# Patient Record
Sex: MALE | Race: WHITE | NOT HISPANIC OR LATINO
[De-identification: names, ages, dates, MRNs, and addresses within clinical notes are randomized per-mention and may not be internally consistent; named-entity substitution may affect disease eponyms.]

---

## 2017-10-29 ENCOUNTER — TRANSCRIPTION ENCOUNTER (OUTPATIENT)
Age: 67
End: 2017-10-29

## 2017-10-29 ENCOUNTER — INPATIENT (INPATIENT)
Facility: HOSPITAL | Age: 67
LOS: 1 days | Discharge: HOME | End: 2017-10-31
Attending: INTERNAL MEDICINE | Admitting: INTERNAL MEDICINE
Payer: MEDICARE

## 2017-10-29 VITALS
RESPIRATION RATE: 16 BRPM | HEART RATE: 72 BPM | DIASTOLIC BLOOD PRESSURE: 92 MMHG | TEMPERATURE: 98 F | SYSTOLIC BLOOD PRESSURE: 152 MMHG | OXYGEN SATURATION: 99 %

## 2017-10-29 DIAGNOSIS — I24.9 ACUTE ISCHEMIC HEART DISEASE, UNSPECIFIED: ICD-10-CM

## 2017-10-29 DIAGNOSIS — I25.10 ATHEROSCLEROTIC HEART DISEASE OF NATIVE CORONARY ARTERY WITHOUT ANGINA PECTORIS: ICD-10-CM

## 2017-10-29 DIAGNOSIS — I21.11 ST ELEVATION (STEMI) MYOCARDIAL INFARCTION INVOLVING RIGHT CORONARY ARTERY: ICD-10-CM

## 2017-10-29 DIAGNOSIS — Z90.49 ACQUIRED ABSENCE OF OTHER SPECIFIED PARTS OF DIGESTIVE TRACT: Chronic | ICD-10-CM

## 2017-10-29 LAB
ALBUMIN SERPL ELPH-MCNC: 4.4 G/DL — SIGNIFICANT CHANGE UP (ref 3.3–5)
ALP SERPL-CCNC: 100 U/L — SIGNIFICANT CHANGE UP (ref 40–120)
ALT FLD-CCNC: 19 U/L — SIGNIFICANT CHANGE UP (ref 4–41)
APTT BLD: 30.2 SEC — SIGNIFICANT CHANGE UP (ref 27.5–37.4)
AST SERPL-CCNC: 30 U/L — SIGNIFICANT CHANGE UP (ref 4–40)
BASOPHILS # BLD AUTO: 0.02 K/UL — SIGNIFICANT CHANGE UP (ref 0–0.2)
BASOPHILS NFR BLD AUTO: 0.2 % — SIGNIFICANT CHANGE UP (ref 0–2)
BILIRUB SERPL-MCNC: 0.5 MG/DL — SIGNIFICANT CHANGE UP (ref 0.2–1.2)
BLD GP AB SCN SERPL QL: NEGATIVE — SIGNIFICANT CHANGE UP
BUN SERPL-MCNC: 16 MG/DL — SIGNIFICANT CHANGE UP (ref 7–23)
CALCIUM SERPL-MCNC: 9.1 MG/DL — SIGNIFICANT CHANGE UP (ref 8.4–10.5)
CHLORIDE SERPL-SCNC: 105 MMOL/L — SIGNIFICANT CHANGE UP (ref 98–107)
CHOLEST SERPL-MCNC: 217 MG/DL — HIGH (ref 120–199)
CK MB BLD-MCNC: 10.1 NG/ML — HIGH (ref 1–6.6)
CK MB BLD-MCNC: 12.3 — HIGH (ref 0–2.5)
CK MB BLD-MCNC: 19.5 NG/ML — HIGH (ref 1–6.6)
CK MB BLD-MCNC: 30 NG/ML — HIGH (ref 1–6.6)
CK MB BLD-MCNC: SIGNIFICANT CHANGE UP (ref 0–2.5)
CK SERPL-CCNC: 107 U/L — SIGNIFICANT CHANGE UP (ref 30–200)
CK SERPL-CCNC: 223 U/L — HIGH (ref 30–200)
CK SERPL-CCNC: 243 U/L — HIGH (ref 30–200)
CO2 SERPL-SCNC: 29 MMOL/L — SIGNIFICANT CHANGE UP (ref 22–31)
CREAT SERPL-MCNC: 1.08 MG/DL — SIGNIFICANT CHANGE UP (ref 0.5–1.3)
EOSINOPHIL # BLD AUTO: 0.04 K/UL — SIGNIFICANT CHANGE UP (ref 0–0.5)
EOSINOPHIL NFR BLD AUTO: 0.4 % — SIGNIFICANT CHANGE UP (ref 0–6)
GLUCOSE SERPL-MCNC: 111 MG/DL — HIGH (ref 70–99)
HBA1C BLD-MCNC: 5.2 % — SIGNIFICANT CHANGE UP (ref 4–5.6)
HBA1C BLD-MCNC: 5.3 % — SIGNIFICANT CHANGE UP (ref 4–5.6)
HCT VFR BLD CALC: 40.7 % — SIGNIFICANT CHANGE UP (ref 39–50)
HCT VFR BLD CALC: 46.1 % — SIGNIFICANT CHANGE UP (ref 39–50)
HDLC SERPL-MCNC: 43 MG/DL — SIGNIFICANT CHANGE UP (ref 35–55)
HGB BLD-MCNC: 14.4 G/DL — SIGNIFICANT CHANGE UP (ref 13–17)
HGB BLD-MCNC: 15.6 G/DL — SIGNIFICANT CHANGE UP (ref 13–17)
IMM GRANULOCYTES # BLD AUTO: 0.03 # — SIGNIFICANT CHANGE UP
IMM GRANULOCYTES NFR BLD AUTO: 0.3 % — SIGNIFICANT CHANGE UP (ref 0–1.5)
INR BLD: 0.99 — SIGNIFICANT CHANGE UP (ref 0.88–1.17)
LIPID PNL WITH DIRECT LDL SERPL: 160 MG/DL — SIGNIFICANT CHANGE UP
LYMPHOCYTES # BLD AUTO: 3.04 K/UL — SIGNIFICANT CHANGE UP (ref 1–3.3)
LYMPHOCYTES # BLD AUTO: 32.3 % — SIGNIFICANT CHANGE UP (ref 13–44)
MAGNESIUM SERPL-MCNC: 2.2 MG/DL — SIGNIFICANT CHANGE UP (ref 1.6–2.6)
MCHC RBC-ENTMCNC: 32.5 PG — SIGNIFICANT CHANGE UP (ref 27–34)
MCHC RBC-ENTMCNC: 32.6 PG — SIGNIFICANT CHANGE UP (ref 27–34)
MCHC RBC-ENTMCNC: 33.8 % — SIGNIFICANT CHANGE UP (ref 32–36)
MCHC RBC-ENTMCNC: 35.4 % — SIGNIFICANT CHANGE UP (ref 32–36)
MCV RBC AUTO: 92.1 FL — SIGNIFICANT CHANGE UP (ref 80–100)
MCV RBC AUTO: 96 FL — SIGNIFICANT CHANGE UP (ref 80–100)
MONOCYTES # BLD AUTO: 0.78 K/UL — SIGNIFICANT CHANGE UP (ref 0–0.9)
MONOCYTES NFR BLD AUTO: 8.3 % — SIGNIFICANT CHANGE UP (ref 2–14)
NEUTROPHILS # BLD AUTO: 5.5 K/UL — SIGNIFICANT CHANGE UP (ref 1.8–7.4)
NEUTROPHILS NFR BLD AUTO: 58.5 % — SIGNIFICANT CHANGE UP (ref 43–77)
NRBC # FLD: 0 — SIGNIFICANT CHANGE UP
NRBC # FLD: 0 — SIGNIFICANT CHANGE UP
NT-PROBNP SERPL-SCNC: 1458 PG/ML — SIGNIFICANT CHANGE UP
PHOSPHATE SERPL-MCNC: 3.5 MG/DL — SIGNIFICANT CHANGE UP (ref 2.5–4.5)
PLATELET # BLD AUTO: 149 K/UL — LOW (ref 150–400)
PLATELET # BLD AUTO: 173 K/UL — SIGNIFICANT CHANGE UP (ref 150–400)
PMV BLD: 10.8 FL — SIGNIFICANT CHANGE UP (ref 7–13)
PMV BLD: 10.8 FL — SIGNIFICANT CHANGE UP (ref 7–13)
POTASSIUM SERPL-MCNC: 4 MMOL/L — SIGNIFICANT CHANGE UP (ref 3.5–5.3)
POTASSIUM SERPL-SCNC: 4 MMOL/L — SIGNIFICANT CHANGE UP (ref 3.5–5.3)
PROT SERPL-MCNC: 7.2 G/DL — SIGNIFICANT CHANGE UP (ref 6–8.3)
PROTHROM AB SERPL-ACNC: 11.1 SEC — SIGNIFICANT CHANGE UP (ref 9.8–13.1)
RBC # BLD: 4.42 M/UL — SIGNIFICANT CHANGE UP (ref 4.2–5.8)
RBC # BLD: 4.8 M/UL — SIGNIFICANT CHANGE UP (ref 4.2–5.8)
RBC # FLD: 12.1 % — SIGNIFICANT CHANGE UP (ref 10.3–14.5)
RBC # FLD: 12.1 % — SIGNIFICANT CHANGE UP (ref 10.3–14.5)
RH IG SCN BLD-IMP: POSITIVE — SIGNIFICANT CHANGE UP
SODIUM SERPL-SCNC: 145 MMOL/L — SIGNIFICANT CHANGE UP (ref 135–145)
TRIGL SERPL-MCNC: 175 MG/DL — HIGH (ref 10–149)
TROPONIN T SERPL-MCNC: 0.25 NG/ML — HIGH (ref 0–0.06)
TROPONIN T SERPL-MCNC: 0.62 NG/ML — HIGH (ref 0–0.06)
TROPONIN T SERPL-MCNC: 0.67 NG/ML — HIGH (ref 0–0.06)
TSH SERPL-MCNC: 3.11 UIU/ML — SIGNIFICANT CHANGE UP (ref 0.27–4.2)
TSH SERPL-MCNC: 5.82 UIU/ML — HIGH (ref 0.27–4.2)
WBC # BLD: 10.8 K/UL — HIGH (ref 3.8–10.5)
WBC # BLD: 9.41 K/UL — SIGNIFICANT CHANGE UP (ref 3.8–10.5)
WBC # FLD AUTO: 10.8 K/UL — HIGH (ref 3.8–10.5)
WBC # FLD AUTO: 9.41 K/UL — SIGNIFICANT CHANGE UP (ref 3.8–10.5)

## 2017-10-29 PROCEDURE — 71010: CPT | Mod: 26

## 2017-10-29 PROCEDURE — 99223 1ST HOSP IP/OBS HIGH 75: CPT

## 2017-10-29 PROCEDURE — 92941 PRQ TRLML REVSC TOT OCCL AMI: CPT | Mod: RC,GC

## 2017-10-29 PROCEDURE — 93458 L HRT ARTERY/VENTRICLE ANGIO: CPT | Mod: 26,59,GC

## 2017-10-29 PROCEDURE — 93010 ELECTROCARDIOGRAM REPORT: CPT | Mod: 76

## 2017-10-29 PROCEDURE — 93306 TTE W/DOPPLER COMPLETE: CPT | Mod: 26

## 2017-10-29 RX ORDER — ATORVASTATIN CALCIUM 80 MG/1
80 TABLET, FILM COATED ORAL AT BEDTIME
Qty: 0 | Refills: 0 | Status: DISCONTINUED | OUTPATIENT
Start: 2017-10-29 | End: 2017-10-31

## 2017-10-29 RX ORDER — ASPIRIN/CALCIUM CARB/MAGNESIUM 324 MG
325 TABLET ORAL DAILY
Qty: 0 | Refills: 0 | Status: DISCONTINUED | OUTPATIENT
Start: 2017-10-29 | End: 2017-10-29

## 2017-10-29 RX ORDER — HEPARIN SODIUM 5000 [USP'U]/ML
5000 INJECTION INTRAVENOUS; SUBCUTANEOUS ONCE
Qty: 0 | Refills: 0 | Status: COMPLETED | OUTPATIENT
Start: 2017-10-29 | End: 2017-10-29

## 2017-10-29 RX ORDER — HEPARIN SODIUM 5000 [USP'U]/ML
5000 INJECTION INTRAVENOUS; SUBCUTANEOUS EVERY 12 HOURS
Qty: 0 | Refills: 0 | Status: DISCONTINUED | OUTPATIENT
Start: 2017-10-29 | End: 2017-10-31

## 2017-10-29 RX ORDER — CLOPIDOGREL BISULFATE 75 MG/1
600 TABLET, FILM COATED ORAL ONCE
Qty: 0 | Refills: 0 | Status: COMPLETED | OUTPATIENT
Start: 2017-10-29 | End: 2017-10-29

## 2017-10-29 RX ORDER — METOPROLOL TARTRATE 50 MG
12.5 TABLET ORAL
Qty: 0 | Refills: 0 | Status: DISCONTINUED | OUTPATIENT
Start: 2017-10-29 | End: 2017-10-31

## 2017-10-29 RX ORDER — ASPIRIN/CALCIUM CARB/MAGNESIUM 324 MG
81 TABLET ORAL DAILY
Qty: 0 | Refills: 0 | Status: DISCONTINUED | OUTPATIENT
Start: 2017-10-29 | End: 2017-10-31

## 2017-10-29 RX ORDER — CLOPIDOGREL BISULFATE 75 MG/1
75 TABLET, FILM COATED ORAL DAILY
Qty: 0 | Refills: 0 | Status: DISCONTINUED | OUTPATIENT
Start: 2017-10-29 | End: 2017-10-31

## 2017-10-29 RX ADMIN — Medication 325 MILLIGRAM(S): at 01:02

## 2017-10-29 RX ADMIN — ATORVASTATIN CALCIUM 80 MILLIGRAM(S): 80 TABLET, FILM COATED ORAL at 21:35

## 2017-10-29 RX ADMIN — HEPARIN SODIUM 5000 UNIT(S): 5000 INJECTION INTRAVENOUS; SUBCUTANEOUS at 01:02

## 2017-10-29 RX ADMIN — CLOPIDOGREL BISULFATE 75 MILLIGRAM(S): 75 TABLET, FILM COATED ORAL at 12:19

## 2017-10-29 RX ADMIN — CLOPIDOGREL BISULFATE 600 MILLIGRAM(S): 75 TABLET, FILM COATED ORAL at 01:02

## 2017-10-29 RX ADMIN — Medication 12.5 MILLIGRAM(S): at 18:00

## 2017-10-29 RX ADMIN — HEPARIN SODIUM 5000 UNIT(S): 5000 INJECTION INTRAVENOUS; SUBCUTANEOUS at 18:00

## 2017-10-29 RX ADMIN — Medication 81 MILLIGRAM(S): at 12:18

## 2017-10-29 NOTE — H&P ADULT - PROBLEM SELECTOR PLAN 1
S/p 1 JOSE MIGUEL to dRCA found 99% occlusion   - Admit to CCU  - TTE in AM  -start ASA 81mg PO daily, plavix 75mg PO daily S/p 1 JOSE MIGUEL to dRCA found 99% occlusion   - Admit to CCU  - TTE in AM  - Trend cardiac enzymes  - Daily weights  - Strict I'sO's  -start ASA 81mg PO daily, plavix 75mg PO daily

## 2017-10-29 NOTE — H&P ADULT - ASSESSMENT
67yoM w/ no PMHx p/w recurrent left sided chest pressure with radiation to left side that started last night a/w SOB and diaphoresis relieved with belching    In ED, found to have IWSTEMI.  Cath team activated, dRCA 99% occluded s/p 1 JOSE MIGUEL.

## 2017-10-29 NOTE — PROGRESS NOTE ADULT - PROBLEM SELECTOR PLAN 1
-Continue DAPT with aspirin and plavix, high intensity statin  -Consider BB today  - TTE in AM  - Trend cardiac enzymes, monitor risk factor profile, lipid panel, TSH, HGBA1c

## 2017-10-29 NOTE — DISCHARGE NOTE ADULT - ADDITIONAL INSTRUCTIONS
follow up with you PCP upon discharge Follow up with the cardiologist (Dr. Delfino Marlow) within 1-2 weeks of discharge.   Follow up with your primary medical provider within 1-2 weeks of discharge. Follow up with the cardiologist (Dr. Delfino Marlow); your appointment is on November 9th, at 9:00 am. Address and phone number are below. Please bring your insurance card and photo ID.   Follow up with your primary medical provider within 1-2 weeks of discharge.

## 2017-10-29 NOTE — H&P ADULT - NSHPSOCIALHISTORY_GEN_ALL_CORE
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  Denies cigarette or illicit drug use.  Occasional beer, 1 per week

## 2017-10-29 NOTE — H&P ADULT - HISTORY OF PRESENT ILLNESS
This is a 67yoM w/ no PMHx p/w recurrent chest pain that started last night a/w GI discomfort. This is a 67yoM w/ no PMHx p/w recurrent left sided chest pressure with radiation to left side that started last night a/w SOB and diaphoresis relieved with belching.  States pain started last night after dinner when he was going for his regular walk and has been intermittent since arrival to hospital.  Patient denies history of chest pain or pressure while ambulating or climbing stairs.  Denies palpitations, lightheadedness/dizziness, LOC during event.  Last visit to a doctor was more than 10 years ago when he saw a urologist for a kidney stone that passed on its own.      In ED, found to have IWSTEMI.  Cath team activated, dRCA 99% occluded s/p 1 JOSE MIGUEL.

## 2017-10-29 NOTE — ED ADULT NURSE NOTE - OBJECTIVE STATEMENT
pt on b ed aox3,Dr. Escobar alerted Red side MD to r/o STEMI c.o  Left sided chest pain since yesterday on and off, radiates to left arm with SOB worse on exertion denies Palpitation Headache Dizziness Nausea vomiting Sweating on cm sinus rhythm MD at [facility] evaluate the pt. will monitor placed on Zoll monitor

## 2017-10-29 NOTE — DISCHARGE NOTE ADULT - HOSPITAL COURSE
Patient is a 67year old  male with PSH of appendicitix and kidney stones, no signigicant PMH, non-smoker, presented to ER with IWSTEMI. Patient went emergently to the cath lab and a catheterization via right radial artery was performed and he was found to have  a 99% dRCA. He is now s/p 1 JOSE MIGUEL to dRCA. Right wrist site is intact with no hematoma, +radial pulse. 67 year old  male with PSH of appendicitix and kidney stones, no significant PMH, non-smoker, presented to ER with IWSTEMI. Patient went emergently to the cath lab and a catheterization via right radial artery was performed and he was found to have a 99% dRCA. He is now s/p 1 JOSE MIGUEL to dRCA. Right wrist site is intact with no hematoma, +radial pulse. 67 year old  male with PSH of appendicitix and kidney stones, no significant PMH, non-smoker, presented to ER with IWSTEMI. Patient went emergently to the cath lab and a catheterization via right radial artery was performed and he was found to have a 99% dRCA. He is now s/p 1 JOSE MIGUEL to dRCA. Right wrist site is intact with no hematoma, +radial pulse. Pt started on DAPT, lipitor, low dose metoprolol. Pt was hypotensive down to 80s at times. ACEI was deferred to be started as outpatient when BP normalizes. TTE showed 45% EF, mild segmental LV dysfunction, hypokinetic inferior and inferolateral walls. Mild diastolic dysfunction (Stage 1). Normal RV. Pt did well after the cath and was ready for discharge.

## 2017-10-29 NOTE — CHART NOTE - NSCHARTNOTEFT_GEN_A_CORE
RADIAL BAND REMOVAL NOTE    Right radial band removed without and complications. No bleeding or hematoma. Positive peripheral pulses. Good capillary refill. RN to monitor site for bleeding and hematoma. nurses notes/vital signs

## 2017-10-29 NOTE — ED PROVIDER NOTE - ST/T WAVE
biphasic TW  inferiorly with mild   St elevation  (<1mm)   downsloping ST in aVL    right sided leads negative

## 2017-10-29 NOTE — ED PROVIDER NOTE - OBJECTIVE STATEMENT
pt presents with recurrent chest pain  Occurred for 1st time last night  lasted a couple of hours  had associated belching but pain was worse with exertion  Resolved until tonight around 5:30  again assoc with belching but continued to wax and wane  Pt denies ever having this pain before  he denies PMH and has not seen a physician in many years

## 2017-10-29 NOTE — ED ADULT TRIAGE NOTE - CHIEF COMPLAINT QUOTE
Pt arrives to ED reporting he does not "feel right" on the left side of his chest.  Pt reports discomfort began yesterday while walking after supper.  Pt reports pain was intermittent.  Pt denies prior cardiac history.  EKG performed in triage. Pt arrives to ED reporting he does not "feel right" on the left side of his chest.  Pt reports discomfort began yesterday while walking after supper.  Pt reports pain was intermittent.  Pt denies prior cardiac history.  EKG performed in triage.  Dr. Escobar reviewed ekg, Charge RN notified, pt brought directly to room 3.

## 2017-10-29 NOTE — ED PROVIDER NOTE - MEDICAL DECISION MAKING DETAILS
pt with new onset CP  worse with exertion  waxing and waning in intensity  EKG  suggestive of inf process  ST elevation mild but assoc biphasic TW  and downsloping ST in aVL    cath attending called   He notified team  given ASA  plavix  heparin bolus

## 2017-10-29 NOTE — DISCHARGE NOTE ADULT - MEDICATION SUMMARY - MEDICATIONS TO TAKE
I will START or STAY ON the medications listed below when I get home from the hospital:    aspirin 81 mg oral delayed release tablet  -- 1 tab(s) by mouth once a day  -- Indication: For CAD (coronary artery disease)    atorvastatin 80 mg oral tablet  -- 1 tab(s) by mouth once a day (at bedtime)  -- Indication: For CAD (coronary artery disease)    clopidogrel 75 mg oral tablet  -- 1 tab(s) by mouth once a day  -- Indication: For CAD (coronary artery disease)    metoprolol tartrate 25 mg oral tablet  -- 0.5 tab(s) by mouth 2 times a day   -- It is very important that you take or use this exactly as directed.  Do not skip doses or discontinue unless directed by your doctor.  May cause drowsiness.  Alcohol may intensify this effect.  Use care when operating dangerous machinery.  Some non-prescription drugs may aggravate your condition.  Read all labels carefully.  If a warning appears, check with your doctor before taking.  Take with food or milk.  This drug may impair the ability to drive or operate machinery.  Use care until you become familiar with its effects.    -- Indication: For CAD (coronary artery disease)

## 2017-10-29 NOTE — DISCHARGE NOTE ADULT - CARE PROVIDER_API CALL
Delfino Marlow (MD; PhD), Cardiology; Internal Medicine; Vascular Medicine  [address] [place] [address] [address] [place] [id number]  Phone: [phone]  Fax: [phone]

## 2017-10-29 NOTE — DISCHARGE NOTE ADULT - PLAN OF CARE
Patient will have no further chest pain Continue medications as prescribed. Follow up with cardiologist regularly. Continue low salt, low cholesterol, low fat diet. Follow a heart healthy diet. Exercise regularly. Patient will make lifestyle modifications to prevent worsening CAD Continue medications as prescribed and reduce stress. Continue medications as prescribed. Follow up with a cardiologist (Dr. Delfino Marlow) within 1-2 weeks of discharge. Follow up with cardiologist regularly. Continue low salt, low cholesterol, low fat diet. Follow a heart healthy diet. Exercise regularly. Continue medications as prescribed. Follow up with a cardiologist (Dr. Delfino Marlow) within 1-2 weeks of discharge. Appointment was made for you. Please see below. Follow up with cardiologist regularly. Continue low salt, low cholesterol, low fat diet. Follow a heart healthy diet. Exercise regularly.

## 2017-10-29 NOTE — ED PROVIDER NOTE - ATTENDING CONTRIBUTION TO CARE
[place]  pt with waxing and waning new onset SSCP with EKG suggestive of inferior process   cath attending called      cath team notifies   given ASA  pl;avix  heparin

## 2017-10-29 NOTE — PROGRESS NOTE ADULT - SUBJECTIVE AND OBJECTIVE BOX
Date of Admission:  10/29/17  24H hour events:   s/p Cath lab overnight 1 stent to distal RCA overnight. Rt radial band removed and site looks good with a positive pulse  Vital Signs Last 24 Hrs  T(C): 36.8 (29 Oct 2017 04:00), Max: 36.8 (29 Oct 2017 02:43)  T(F): 98.2 (29 Oct 2017 04:00), Max: 98.2 (29 Oct 2017 02:43)  HR: 71 (29 Oct 2017 07:30) (66 - 80)  BP: 117/69 (29 Oct 2017 07:30) (116/67 - 163/85)  BP(mean): 80 (29 Oct 2017 07:30) (79 - 92)  RR: 15 (29 Oct 2017 07:30) (14 - 19)  SpO2: 99% (29 Oct 2017 07:30) (99% - 100%)  I&O's Summary    28 Oct 2017 07:01  -  29 Oct 2017 07:00  --------------------------------------------------------  IN: 0 mL / OUT: 1250 mL / NET: -1250 mL        MEDICATIONS:  aspirin enteric coated 81 milliGRAM(s) Oral daily  clopidogrel Tablet 75 milliGRAM(s) Oral daily  heparin  Injectable 5000 Unit(s) SubCutaneous every 12 hours  atorvastatin 80 milliGRAM(s) Oral at bedtime    REVIEW OF SYSTEMS:  Complete 10point ROS negative.    PHYSICAL EXAM:  General: NAD  Cardiac: +S1+S2 RRR No MRG  Resp: CTA bilateral  GI: soft, nontender, nondistended  Ext: no CCE  tele:SR      LABS:	 	    CBC Full  -  ( 29 Oct 2017 00:45 )  WBC Count : 9.41 K/uL  Hemoglobin : 15.6 g/dL  Hematocrit : 46.1 %  Platelet Count - Automated : 173 K/uL  Mean Cell Volume : 96.0 fL  Mean Cell Hemoglobin : 32.5 pg  Mean Cell Hemoglobin Concentration : 33.8 %  Auto Neutrophil # : 5.50 K/uL  Auto Lymphocyte # : 3.04 K/uL  Auto Monocyte # : 0.78 K/uL  Auto Eosinophil # : 0.04 K/uL  Auto Basophil # : 0.02 K/uL  Auto Neutrophil % : 58.5 %  Auto Lymphocyte % : 32.3 %  Auto Monocyte % : 8.3 %  Auto Eosinophil % : 0.4 %  Auto Basophil % : 0.2 %    10-29    145  |  105  |  16  ----------------------------<  111<H>  4.0   |  29  |  1.08    Ca    9.1      29 Oct 2017 00:45  Phos  3.5     10-29  Mg     2.2     10-29    TPro  7.2  /  Alb  4.4  /  TBili  0.5  /  DBili  x   /  AST  30  /  ALT  19  /  AlkPhos  100  10-29      proBNP: Serum Pro-Brain Natriuretic Peptide: 1458 pg/mL (10-29 @ 00:45)

## 2017-10-29 NOTE — H&P ADULT - FAMILY HISTORY
Grandparent  Still living? Unknown  Family history of coronary artery disease in grandmother, Age at diagnosis: Age Unknown  Family history of coronary artery disease in grandfather, Age at diagnosis: Age Unknown

## 2017-10-29 NOTE — DISCHARGE NOTE ADULT - CARE PLAN
Principal Discharge DX:	STEMI involving right coronary artery  Goal:	Patient will have no further chest pain  Instructions for follow-up, activity and diet:	Continue medications as prescribed. Follow up with cardiologist regularly. Continue low salt, low cholesterol, low fat diet. Follow a heart healthy diet. Exercise regularly.  Secondary Diagnosis:	CAD (coronary artery disease)  Goal:	Patient will make lifestyle modifications to prevent worsening CAD  Instructions for follow-up, activity and diet:	Continue medications as prescribed and reduce stress. Principal Discharge DX:	STEMI involving right coronary artery  Goal:	Patient will have no further chest pain  Instructions for follow-up, activity and diet:	Continue medications as prescribed. Follow up with a cardiologist (Dr. Delfino Marlow) within 1-2 weeks of discharge. Follow up with cardiologist regularly. Continue low salt, low cholesterol, low fat diet. Follow a heart healthy diet. Exercise regularly.  Secondary Diagnosis:	CAD (coronary artery disease)  Goal:	Patient will make lifestyle modifications to prevent worsening CAD  Instructions for follow-up, activity and diet:	Continue medications as prescribed and reduce stress. Principal Discharge DX:	STEMI involving right coronary artery  Goal:	Patient will have no further chest pain  Instructions for follow-up, activity and diet:	Continue medications as prescribed. Follow up with a cardiologist (Dr. Delfino Marlow) within 1-2 weeks of discharge. Appointment was made for you. Please see below. Follow up with cardiologist regularly. Continue low salt, low cholesterol, low fat diet. Follow a heart healthy diet. Exercise regularly.  Secondary Diagnosis:	CAD (coronary artery disease)  Goal:	Patient will make lifestyle modifications to prevent worsening CAD  Instructions for follow-up, activity and diet:	Continue medications as prescribed and reduce stress.

## 2017-10-29 NOTE — ED PROVIDER NOTE - MUSCULOSKELETAL, MLM
Spine appears normal, range of motion is not limited, no muscle or joint tenderness  pt with nl pulses all H extremities

## 2017-10-29 NOTE — ED ADULT NURSE NOTE - CHIEF COMPLAINT QUOTE
Pt arrives to ED reporting he does not "feel right" on the left side of his chest.  Pt reports discomfort began yesterday while walking after supper.  Pt reports pain was intermittent.  Pt denies prior cardiac history.  EKG performed in triage.  Dr. Escobar reviewed ekg, Charge RN notified, pt brought directly to room 3.

## 2017-10-30 LAB
BUN SERPL-MCNC: 17 MG/DL — SIGNIFICANT CHANGE UP (ref 7–23)
CALCIUM SERPL-MCNC: 8.7 MG/DL — SIGNIFICANT CHANGE UP (ref 8.4–10.5)
CHLORIDE SERPL-SCNC: 103 MMOL/L — SIGNIFICANT CHANGE UP (ref 98–107)
CK MB BLD-MCNC: 12.71 NG/ML — HIGH (ref 1–6.6)
CK SERPL-CCNC: 170 U/L — SIGNIFICANT CHANGE UP (ref 30–200)
CO2 SERPL-SCNC: 24 MMOL/L — SIGNIFICANT CHANGE UP (ref 22–31)
CREAT SERPL-MCNC: 0.92 MG/DL — SIGNIFICANT CHANGE UP (ref 0.5–1.3)
GLUCOSE SERPL-MCNC: 96 MG/DL — SIGNIFICANT CHANGE UP (ref 70–99)
HCT VFR BLD CALC: 40.9 % — SIGNIFICANT CHANGE UP (ref 39–50)
HGB BLD-MCNC: 14 G/DL — SIGNIFICANT CHANGE UP (ref 13–17)
MAGNESIUM SERPL-MCNC: 2.1 MG/DL — SIGNIFICANT CHANGE UP (ref 1.6–2.6)
MCHC RBC-ENTMCNC: 31.7 PG — SIGNIFICANT CHANGE UP (ref 27–34)
MCHC RBC-ENTMCNC: 34.2 % — SIGNIFICANT CHANGE UP (ref 32–36)
MCV RBC AUTO: 92.7 FL — SIGNIFICANT CHANGE UP (ref 80–100)
NRBC # FLD: 0 — SIGNIFICANT CHANGE UP
PHOSPHATE SERPL-MCNC: 3.4 MG/DL — SIGNIFICANT CHANGE UP (ref 2.5–4.5)
PLATELET # BLD AUTO: 146 K/UL — LOW (ref 150–400)
PMV BLD: 11 FL — SIGNIFICANT CHANGE UP (ref 7–13)
POTASSIUM SERPL-MCNC: 4.2 MMOL/L — SIGNIFICANT CHANGE UP (ref 3.5–5.3)
POTASSIUM SERPL-SCNC: 4.2 MMOL/L — SIGNIFICANT CHANGE UP (ref 3.5–5.3)
RBC # BLD: 4.41 M/UL — SIGNIFICANT CHANGE UP (ref 4.2–5.8)
RBC # FLD: 12.3 % — SIGNIFICANT CHANGE UP (ref 10.3–14.5)
SODIUM SERPL-SCNC: 141 MMOL/L — SIGNIFICANT CHANGE UP (ref 135–145)
TROPONIN T SERPL-MCNC: 0.67 NG/ML — HIGH (ref 0–0.06)
WBC # BLD: 8.1 K/UL — SIGNIFICANT CHANGE UP (ref 3.8–10.5)
WBC # FLD AUTO: 8.1 K/UL — SIGNIFICANT CHANGE UP (ref 3.8–10.5)

## 2017-10-30 PROCEDURE — 99233 SBSQ HOSP IP/OBS HIGH 50: CPT

## 2017-10-30 PROCEDURE — 93010 ELECTROCARDIOGRAM REPORT: CPT

## 2017-10-30 RX ORDER — SODIUM CHLORIDE 9 MG/ML
250 INJECTION INTRAMUSCULAR; INTRAVENOUS; SUBCUTANEOUS ONCE
Qty: 0 | Refills: 0 | Status: COMPLETED | OUTPATIENT
Start: 2017-10-30 | End: 2017-10-30

## 2017-10-30 RX ADMIN — Medication 81 MILLIGRAM(S): at 11:13

## 2017-10-30 RX ADMIN — HEPARIN SODIUM 5000 UNIT(S): 5000 INJECTION INTRAVENOUS; SUBCUTANEOUS at 18:06

## 2017-10-30 RX ADMIN — Medication 12.5 MILLIGRAM(S): at 18:06

## 2017-10-30 RX ADMIN — CLOPIDOGREL BISULFATE 75 MILLIGRAM(S): 75 TABLET, FILM COATED ORAL at 11:13

## 2017-10-30 RX ADMIN — SODIUM CHLORIDE 1000 MILLILITER(S): 9 INJECTION INTRAMUSCULAR; INTRAVENOUS; SUBCUTANEOUS at 11:05

## 2017-10-30 RX ADMIN — HEPARIN SODIUM 5000 UNIT(S): 5000 INJECTION INTRAVENOUS; SUBCUTANEOUS at 05:52

## 2017-10-30 RX ADMIN — ATORVASTATIN CALCIUM 80 MILLIGRAM(S): 80 TABLET, FILM COATED ORAL at 23:09

## 2017-10-30 NOTE — PROGRESS NOTE ADULT - SUBJECTIVE AND OBJECTIVE BOX
Patient is a 67y old  Male who presents with a chief complaint of IWSTEMI (29 Oct 2017 11:26)      Overnight Events: Hypotensive overnight down to 88/49, 95/49. Tele: PVCs, rate in 60s. Today, pt feels fine. Denies CP, SOB, fevers, chills, n/v, diaphoresis, abd pain, diarrhea.     Vital Signs Last 24 Hrs  T(C): 36.7 (30 Oct 2017 08:00), Max: 37.5 (29 Oct 2017 19:58)  T(F): 98.1 (30 Oct 2017 08:00), Max: 99.5 (29 Oct 2017 19:58)  HR: 67 (30 Oct 2017 08:00) (1 - 96)  BP: 109/63 (30 Oct 2017 08:00) (94/49 - 136/73)  BP(mean): 75 (30 Oct 2017 08:00) (59 - 90)  RR: 14 (30 Oct 2017 08:00) (14 - 20)  SpO2: 99% (30 Oct 2017 08:00) (95% - 99%)    CAPILLARY BLOOD GLUCOSE          I&O's Summary    29 Oct 2017 07:01  -  30 Oct 2017 07:00  --------------------------------------------------------  IN: 800 mL / OUT: 400 mL / NET: 400 mL        Gen: NAD, lying comfortably in bed.   HEENT: MMM  Cardio: S1 S2 WNL, No M/R/G, RRR  Resp: CTA B/L  Abd: soft, NT/ND BS+  Skin: no rashes, moist, no LE edema  Msk: normal ROM  Neuro: no focal deficits, CN grossly intact  Psych: AAO X3    MEDICATIONS  (STANDING):  aspirin enteric coated 81 milliGRAM(s) Oral daily  atorvastatin 80 milliGRAM(s) Oral at bedtime  clopidogrel Tablet 75 milliGRAM(s) Oral daily  heparin  Injectable 5000 Unit(s) SubCutaneous every 12 hours  metoprolol     tartrate 12.5 milliGRAM(s) Oral two times a day    MEDICATIONS  (PRN):      CBC Full  -  ( 30 Oct 2017 03:50 )  WBC Count : 8.10 K/uL  Hemoglobin : 14.0 g/dL  Hematocrit : 40.9 %  Platelet Count - Automated : 146 K/uL  Mean Cell Volume : 92.7 fL  Mean Cell Hemoglobin : 31.7 pg  Mean Cell Hemoglobin Concentration : 34.2 %  Auto Neutrophil # : x  Auto Lymphocyte # : x  Auto Monocyte # : x  Auto Eosinophil # : x  Auto Basophil # : x  Auto Neutrophil % : x  Auto Lymphocyte % : x  Auto Monocyte % : x  Auto Eosinophil % : x  Auto Basophil % : x                            14.0   8.10  )-----------( 146      ( 30 Oct 2017 03:50 )             40.9       LIVER FUNCTIONS - ( 29 Oct 2017 00:45 )  Alb: 4.4 g/dL / Pro: 7.2 g/dL / ALK PHOS: 100 u/L / ALT: 19 u/L / AST: 30 u/L / GGT: x             PT/INR - ( 29 Oct 2017 00:45 )   PT: 11.1 SEC;   INR: 0.99          PTT - ( 29 Oct 2017 00:45 )  PTT:30.2 SEC    CARDIAC MARKERS ( 30 Oct 2017 03:50 )  x     / 0.67 ng/mL / 170 u/L / 12.71 ng/mL / x      CARDIAC MARKERS ( 29 Oct 2017 19:40 )  x     / 0.62 ng/mL / 223 u/L / 19.50 ng/mL / x      CARDIAC MARKERS ( 29 Oct 2017 09:00 )  x     / 0.67 ng/mL / 243 u/L / 30.00 ng/mL / x      CARDIAC MARKERS ( 29 Oct 2017 00:45 )  x     / 0.25 ng/mL / 107 u/L / 10.10 ng/mL / x                    Imaging:    Assessment:    Plan:

## 2017-10-30 NOTE — PROGRESS NOTE ADULT - ATTENDING COMMENTS
Patient seen and examined. Agree with above assessment and plan. IWMI with PCI to RCA. Had episodes of hypotension overnight and given IVF bolus.   Given beta blocker at 6am and now on hold.   Will check TTE

## 2017-10-30 NOTE — PROGRESS NOTE ADULT - ASSESSMENT
67yoM w/ no PMHx p/w recurrent left sided chest pressure with radiation to left side that started Saturday night a/w SOB and diaphoresis relieved with belching, found to have IWSTEMI s/p Cath dRCA 99% occluded s/p 1 JOSE MIGUEL.

## 2017-10-30 NOTE — PROGRESS NOTE ADULT - PROBLEM SELECTOR PLAN 1
S/p 1 JOSE MIGUEL to dRCA found 99% occlusion   - TTE in today  - Trend cardiac enzymes  - Daily weights  - Strict I/O  - c/w ASA 81mg PO daily, plavix 75mg PO daily

## 2017-10-31 VITALS — DIASTOLIC BLOOD PRESSURE: 64 MMHG | HEART RATE: 70 BPM | SYSTOLIC BLOOD PRESSURE: 112 MMHG

## 2017-10-31 LAB
BUN SERPL-MCNC: 19 MG/DL — SIGNIFICANT CHANGE UP (ref 7–23)
CALCIUM SERPL-MCNC: 8.4 MG/DL — SIGNIFICANT CHANGE UP (ref 8.4–10.5)
CHLORIDE SERPL-SCNC: 104 MMOL/L — SIGNIFICANT CHANGE UP (ref 98–107)
CO2 SERPL-SCNC: 22 MMOL/L — SIGNIFICANT CHANGE UP (ref 22–31)
CREAT SERPL-MCNC: 1.08 MG/DL — SIGNIFICANT CHANGE UP (ref 0.5–1.3)
GLUCOSE SERPL-MCNC: 95 MG/DL — SIGNIFICANT CHANGE UP (ref 70–99)
HCT VFR BLD CALC: 40 % — SIGNIFICANT CHANGE UP (ref 39–50)
HGB BLD-MCNC: 13.9 G/DL — SIGNIFICANT CHANGE UP (ref 13–17)
MAGNESIUM SERPL-MCNC: 1.9 MG/DL — SIGNIFICANT CHANGE UP (ref 1.6–2.6)
MCHC RBC-ENTMCNC: 32.2 PG — SIGNIFICANT CHANGE UP (ref 27–34)
MCHC RBC-ENTMCNC: 34.8 % — SIGNIFICANT CHANGE UP (ref 32–36)
MCV RBC AUTO: 92.6 FL — SIGNIFICANT CHANGE UP (ref 80–100)
NRBC # FLD: 0 — SIGNIFICANT CHANGE UP
PHOSPHATE SERPL-MCNC: 3.4 MG/DL — SIGNIFICANT CHANGE UP (ref 2.5–4.5)
PLATELET # BLD AUTO: 146 K/UL — LOW (ref 150–400)
PMV BLD: 11.1 FL — SIGNIFICANT CHANGE UP (ref 7–13)
POTASSIUM SERPL-MCNC: 4.4 MMOL/L — SIGNIFICANT CHANGE UP (ref 3.5–5.3)
POTASSIUM SERPL-SCNC: 4.4 MMOL/L — SIGNIFICANT CHANGE UP (ref 3.5–5.3)
RBC # BLD: 4.32 M/UL — SIGNIFICANT CHANGE UP (ref 4.2–5.8)
RBC # FLD: 12.1 % — SIGNIFICANT CHANGE UP (ref 10.3–14.5)
SODIUM SERPL-SCNC: 141 MMOL/L — SIGNIFICANT CHANGE UP (ref 135–145)
WBC # BLD: 7.96 K/UL — SIGNIFICANT CHANGE UP (ref 3.8–10.5)
WBC # FLD AUTO: 7.96 K/UL — SIGNIFICANT CHANGE UP (ref 3.8–10.5)

## 2017-10-31 RX ORDER — METOPROLOL TARTRATE 50 MG
0.5 TABLET ORAL
Qty: 30 | Refills: 0
Start: 2017-10-31 | End: 2017-11-30

## 2017-10-31 RX ORDER — ASPIRIN/CALCIUM CARB/MAGNESIUM 324 MG
1 TABLET ORAL
Qty: 30 | Refills: 0 | OUTPATIENT
Start: 2017-10-31 | End: 2017-11-30

## 2017-10-31 RX ORDER — ATORVASTATIN CALCIUM 80 MG/1
1 TABLET, FILM COATED ORAL
Qty: 30 | Refills: 0 | OUTPATIENT
Start: 2017-10-31 | End: 2017-11-30

## 2017-10-31 RX ORDER — CLOPIDOGREL BISULFATE 75 MG/1
1 TABLET, FILM COATED ORAL
Qty: 30 | Refills: 0
Start: 2017-10-31 | End: 2017-11-30

## 2017-10-31 RX ORDER — ASPIRIN/CALCIUM CARB/MAGNESIUM 324 MG
1 TABLET ORAL
Qty: 30 | Refills: 0
Start: 2017-10-31 | End: 2017-11-30

## 2017-10-31 RX ORDER — ATORVASTATIN CALCIUM 80 MG/1
1 TABLET, FILM COATED ORAL
Qty: 30 | Refills: 0
Start: 2017-10-31 | End: 2017-11-30

## 2017-10-31 RX ORDER — CLOPIDOGREL BISULFATE 75 MG/1
1 TABLET, FILM COATED ORAL
Qty: 30 | Refills: 0 | OUTPATIENT
Start: 2017-10-31 | End: 2017-11-30

## 2017-10-31 RX ORDER — MAGNESIUM SULFATE 500 MG/ML
2 VIAL (ML) INJECTION ONCE
Qty: 0 | Refills: 0 | Status: COMPLETED | OUTPATIENT
Start: 2017-10-31 | End: 2017-10-31

## 2017-10-31 RX ORDER — METOPROLOL TARTRATE 50 MG
0.5 TABLET ORAL
Qty: 30 | Refills: 0 | OUTPATIENT
Start: 2017-10-31 | End: 2017-11-30

## 2017-10-31 RX ADMIN — Medication 50 GRAM(S): at 08:34

## 2017-10-31 RX ADMIN — CLOPIDOGREL BISULFATE 75 MILLIGRAM(S): 75 TABLET, FILM COATED ORAL at 12:00

## 2017-10-31 RX ADMIN — Medication 81 MILLIGRAM(S): at 12:00

## 2017-10-31 RX ADMIN — HEPARIN SODIUM 5000 UNIT(S): 5000 INJECTION INTRAVENOUS; SUBCUTANEOUS at 06:23

## 2017-10-31 RX ADMIN — Medication 12.5 MILLIGRAM(S): at 09:29

## 2017-10-31 NOTE — PROGRESS NOTE ADULT - ATTENDING COMMENTS
Agree with above assessment and plan. Patients EF~45%. IWMI with PCI to RCA. BP on lower side with metoprolol 12.5 mg BID  Hold ACEi for now given hypotension; can start as outpatient  Continue high dose statin  Continue ASA and Plavix  DC home with outpatient followup

## 2017-10-31 NOTE — PROGRESS NOTE ADULT - SUBJECTIVE AND OBJECTIVE BOX
Patient is a 67y old  Male who presents with a chief complaint of IWSTEMI (29 Oct 2017 11:26)      Overnight Events: No acute overnight events Tele: PVCs, rate in 60s-90s. Today, pt feels fine. Denies CP, SOB, fevers, chills, n/v, diaphoresis, abd pain, diarrhea.       MEDICATIONS  (STANDING):  aspirin enteric coated 81 milliGRAM(s) Oral daily  atorvastatin 80 milliGRAM(s) Oral at bedtime  clopidogrel Tablet 75 milliGRAM(s) Oral daily  heparin  Injectable 5000 Unit(s) SubCutaneous every 12 hours  metoprolol     tartrate 12.5 milliGRAM(s) Oral two times a day    MEDICATIONS  (PRN):    Vital Signs - Last 24 Hrs    T(C): 37 (10-31-17 @ 07:30), Max: 37.3 (10-30-17 @ 20:00)  HR: 68 (10-31-17 @ 08:00) (64 - 106)  BP: 97/51 (10-31-17 @ 08:00) (85/69 - 131/76)  RR: 16 (10-31-17 @ 08:00) (14 - 18)  SpO2: 94% (10-31-17 @ 08:00) (94% - 100%)  Wt(kg): --  Daily     Daily     I&O's Summary    30 Oct 2017 07:01  -  31 Oct 2017 07:00  --------------------------------------------------------  IN: 790 mL / OUT: 0 mL / NET: 790 mL    31 Oct 2017 07:01  -  31 Oct 2017 09:05  --------------------------------------------------------  IN: 50 mL / OUT: 0 mL / NET: 50 mL        Gen: NAD, lying comfortably in bed.   HEENT: MMM  Cardio: S1 S2 WNL, No M/R/G, RRR  Resp: CTA B/L  Abd: soft, NT/ND BS+  Skin: no rashes, moist, no LE edema  Msk: normal ROM  Neuro: no focal deficits, CN grossly intact  Psych: AAO X3      LABS:   10-31    141  |  104  |  19  ----------------------------<  95  4.4   |  22  |  1.08  10-30    141  |  103  |  17  ----------------------------<  96  4.2   |  24  |  0.92  10-29    145  |  105  |  16  ----------------------------<  111<H>  4.0   |  29  |  1.08    Ca    8.4      31 Oct 2017 05:15  Ca    8.7      30 Oct 2017 03:50  Ca    9.1      29 Oct 2017 00:45  Phos  3.4     10-31  Mg     1.9     10-31    TPro  7.2  /  Alb  4.4  /  TBili  0.5  /  DBili  x   /  AST  30  /  ALT  19  /  AlkPhos  100  10-29                                              13.9   7.96  )-----------( 146      ( 31 Oct 2017 05:15 )             40.0                         14.0   8.10  )-----------( 146      ( 30 Oct 2017 03:50 )             40.9                         14.4   10.80 )-----------( 149      ( 29 Oct 2017 09:00 )             40.7     CAPILLARY BLOOD GLUCOSE          Imaging:    Assessment:    Plan:

## 2017-10-31 NOTE — PROGRESS NOTE ADULT - PROBLEM SELECTOR PLAN 1
S/p 1 JOSE MIGUEL to dRCA found 99% occlusion   - TTE showed 45% EF, mild segmental LV dysfunction  - c/w ASA 81mg PO daily, plavix 75mg PO daily S/p 1 JOSE MIGUEL to dRCA found 99% occlusion   - TTE showed 45% EF, mild segmental LV dysfunction  - c/w ASA 81mg PO daily, plavix 75mg PO daily  - defer starting ACEI for outpt  - f/u w/ cardiology Dr. Delfino Marlow as outpt

## 2017-11-08 ENCOUNTER — RECORD ABSTRACTING (OUTPATIENT)
Age: 67
End: 2017-11-08

## 2017-11-09 ENCOUNTER — APPOINTMENT (OUTPATIENT)
Dept: CARDIOLOGY | Facility: CLINIC | Age: 67
End: 2017-11-09
Payer: MEDICARE

## 2017-11-09 ENCOUNTER — NON-APPOINTMENT (OUTPATIENT)
Age: 67
End: 2017-11-09

## 2017-11-09 VITALS
WEIGHT: 166 LBS | RESPIRATION RATE: 16 BRPM | HEIGHT: 68 IN | BODY MASS INDEX: 25.16 KG/M2 | SYSTOLIC BLOOD PRESSURE: 123 MMHG | OXYGEN SATURATION: 98 % | HEART RATE: 57 BPM | DIASTOLIC BLOOD PRESSURE: 76 MMHG

## 2017-11-09 VITALS — HEIGHT: 68 IN | BODY MASS INDEX: 25.24 KG/M2

## 2017-11-09 DIAGNOSIS — I21.11 ST ELEVATION (STEMI) MYOCARDIAL INFARCTION INVOLVING RIGHT CORONARY ARTERY: ICD-10-CM

## 2017-11-09 PROCEDURE — 99204 OFFICE O/P NEW MOD 45 MIN: CPT

## 2017-11-09 PROCEDURE — 93000 ELECTROCARDIOGRAM COMPLETE: CPT

## 2017-11-27 ENCOUNTER — MEDICATION RENEWAL (OUTPATIENT)
Age: 67
End: 2017-11-27

## 2017-12-21 ENCOUNTER — NON-APPOINTMENT (OUTPATIENT)
Age: 67
End: 2017-12-21

## 2017-12-21 ENCOUNTER — APPOINTMENT (OUTPATIENT)
Dept: CARDIOLOGY | Facility: CLINIC | Age: 67
End: 2017-12-21
Payer: MEDICARE

## 2017-12-21 VITALS
WEIGHT: 172 LBS | OXYGEN SATURATION: 98 % | SYSTOLIC BLOOD PRESSURE: 129 MMHG | HEIGHT: 68 IN | HEART RATE: 66 BPM | RESPIRATION RATE: 16 BRPM | BODY MASS INDEX: 26.07 KG/M2 | DIASTOLIC BLOOD PRESSURE: 79 MMHG

## 2017-12-21 PROCEDURE — 93000 ELECTROCARDIOGRAM COMPLETE: CPT

## 2017-12-21 PROCEDURE — 99214 OFFICE O/P EST MOD 30 MIN: CPT

## 2017-12-29 ENCOUNTER — MEDICATION RENEWAL (OUTPATIENT)
Age: 67
End: 2017-12-29

## 2018-03-26 ENCOUNTER — APPOINTMENT (OUTPATIENT)
Dept: CARDIOLOGY | Facility: CLINIC | Age: 68
End: 2018-03-26
Payer: MEDICARE

## 2018-03-26 ENCOUNTER — NON-APPOINTMENT (OUTPATIENT)
Age: 68
End: 2018-03-26

## 2018-03-26 VITALS
HEART RATE: 59 BPM | DIASTOLIC BLOOD PRESSURE: 84 MMHG | WEIGHT: 170 LBS | HEIGHT: 68 IN | SYSTOLIC BLOOD PRESSURE: 130 MMHG | BODY MASS INDEX: 25.76 KG/M2 | OXYGEN SATURATION: 97 %

## 2018-03-26 PROCEDURE — 93000 ELECTROCARDIOGRAM COMPLETE: CPT

## 2018-03-26 PROCEDURE — 99214 OFFICE O/P EST MOD 30 MIN: CPT

## 2018-08-22 ENCOUNTER — APPOINTMENT (OUTPATIENT)
Dept: CARDIOLOGY | Facility: CLINIC | Age: 68
End: 2018-08-22
Payer: MEDICARE

## 2018-08-22 ENCOUNTER — NON-APPOINTMENT (OUTPATIENT)
Age: 68
End: 2018-08-22

## 2018-08-22 VITALS
WEIGHT: 174 LBS | DIASTOLIC BLOOD PRESSURE: 88 MMHG | BODY MASS INDEX: 26.37 KG/M2 | OXYGEN SATURATION: 97 % | HEART RATE: 56 BPM | HEIGHT: 68 IN | SYSTOLIC BLOOD PRESSURE: 147 MMHG

## 2018-08-22 PROCEDURE — 99214 OFFICE O/P EST MOD 30 MIN: CPT

## 2018-08-22 PROCEDURE — 93000 ELECTROCARDIOGRAM COMPLETE: CPT

## 2019-01-23 ENCOUNTER — RX RENEWAL (OUTPATIENT)
Age: 69
End: 2019-01-23

## 2019-03-15 ENCOUNTER — MEDICATION RENEWAL (OUTPATIENT)
Age: 69
End: 2019-03-15

## 2019-03-18 ENCOUNTER — APPOINTMENT (OUTPATIENT)
Dept: CARDIOLOGY | Facility: CLINIC | Age: 69
End: 2019-03-18
Payer: MEDICARE

## 2019-03-18 PROCEDURE — 36415 COLL VENOUS BLD VENIPUNCTURE: CPT

## 2019-03-19 ENCOUNTER — RX CHANGE (OUTPATIENT)
Age: 69
End: 2019-03-19

## 2019-03-19 LAB
ALBUMIN SERPL ELPH-MCNC: 4.1 G/DL
ALP BLD-CCNC: 119 U/L
ALT SERPL-CCNC: 34 U/L
ANION GAP SERPL CALC-SCNC: 16 MMOL/L
AST SERPL-CCNC: 36 U/L
BASOPHILS # BLD AUTO: 0.02 K/UL
BASOPHILS NFR BLD AUTO: 0.3 %
BILIRUB DIRECT SERPL-MCNC: 0.2 MG/DL
BILIRUB INDIRECT SERPL-MCNC: 0.8 MG/DL
BILIRUB SERPL-MCNC: 1 MG/DL
BUN SERPL-MCNC: 18 MG/DL
CALCIUM SERPL-MCNC: 9.3 MG/DL
CHLORIDE SERPL-SCNC: 106 MMOL/L
CHOLEST SERPL-MCNC: 126 MG/DL
CHOLEST/HDLC SERPL: 3.7 RATIO
CO2 SERPL-SCNC: 25 MMOL/L
CREAT SERPL-MCNC: 1.1 MG/DL
EOSINOPHIL # BLD AUTO: 0.07 K/UL
EOSINOPHIL NFR BLD AUTO: 1 %
GLUCOSE SERPL-MCNC: 55 MG/DL
HBA1C MFR BLD HPLC: 5.3 %
HCT VFR BLD CALC: 45.6 %
HDLC SERPL-MCNC: 34 MG/DL
HGB BLD-MCNC: 14.9 G/DL
IMM GRANULOCYTES NFR BLD AUTO: 0.1 %
LDLC SERPL CALC-MCNC: 63 MG/DL
LYMPHOCYTES # BLD AUTO: 1.67 K/UL
LYMPHOCYTES NFR BLD AUTO: 23.4 %
MAN DIFF?: NORMAL
MCHC RBC-ENTMCNC: 32.3 PG
MCHC RBC-ENTMCNC: 32.7 GM/DL
MCV RBC AUTO: 98.9 FL
MONOCYTES # BLD AUTO: 0.48 K/UL
MONOCYTES NFR BLD AUTO: 6.7 %
NEUTROPHILS # BLD AUTO: 4.89 K/UL
NEUTROPHILS NFR BLD AUTO: 68.5 %
PLATELET # BLD AUTO: 177 K/UL
POTASSIUM SERPL-SCNC: 3.8 MMOL/L
PROT SERPL-MCNC: 7 G/DL
PSA SERPL-MCNC: 6.3 NG/ML
RBC # BLD: 4.61 M/UL
RBC # FLD: 12.7 %
SODIUM SERPL-SCNC: 147 MMOL/L
TRIGL SERPL-MCNC: 144 MG/DL
WBC # FLD AUTO: 7.14 K/UL

## 2019-03-20 ENCOUNTER — NON-APPOINTMENT (OUTPATIENT)
Age: 69
End: 2019-03-20

## 2019-03-20 ENCOUNTER — APPOINTMENT (OUTPATIENT)
Dept: CARDIOLOGY | Facility: CLINIC | Age: 69
End: 2019-03-20
Payer: MEDICARE

## 2019-03-20 VITALS
SYSTOLIC BLOOD PRESSURE: 126 MMHG | RESPIRATION RATE: 16 BRPM | OXYGEN SATURATION: 98 % | DIASTOLIC BLOOD PRESSURE: 81 MMHG | WEIGHT: 170 LBS | BODY MASS INDEX: 25.76 KG/M2 | HEART RATE: 57 BPM | HEIGHT: 68 IN

## 2019-03-20 PROCEDURE — 93000 ELECTROCARDIOGRAM COMPLETE: CPT

## 2019-03-20 PROCEDURE — 99214 OFFICE O/P EST MOD 30 MIN: CPT

## 2019-03-20 RX ORDER — CLOPIDOGREL BISULFATE 75 MG/1
75 TABLET, FILM COATED ORAL DAILY
Qty: 90 | Refills: 3 | Status: DISCONTINUED | COMMUNITY
Start: 1900-01-01 | End: 2019-03-20

## 2019-03-20 NOTE — DISCUSSION/SUMMARY
[Cardiomyopathy] : cardiomyopathy [Coronary Artery Disease] : coronary artery disease [Stable] : stable [FreeTextEntry1] : \par Currently stable from a cardiovascular standpoint. Normotensive. History of ischemic cardiomyopathy. Currently euvolemic. Stable CAD. No ischemic or CHF symptoms. ECG completed today and reviewed. Recent labs reviewed. Lipids acceptable. May discontinue clopidogrel at this time. Continue all other current medications. Advised patient to follow up with urology for elevated PSA. Follow up in 6 months.

## 2019-03-20 NOTE — PHYSICAL EXAM
[General Appearance - Well Developed] : well developed [Normal Appearance] : normal appearance [Well Groomed] : well groomed [General Appearance - Well Nourished] : well nourished [No Deformities] : no deformities [General Appearance - In No Acute Distress] : no acute distress [Normal Conjunctiva] : the conjunctiva exhibited no abnormalities [Eyelids - No Xanthelasma] : the eyelids demonstrated no xanthelasmas [Normal Oral Mucosa] : normal oral mucosa [No Oral Pallor] : no oral pallor [FreeTextEntry1] : no carotid bruits or JVD [No Oral Cyanosis] : no oral cyanosis [Respiration, Rhythm And Depth] : normal respiratory rhythm and effort [Auscultation Breath Sounds / Voice Sounds] : lungs were clear to auscultation bilaterally [Exaggerated Use Of Accessory Muscles For Inspiration] : no accessory muscle use [Heart Rate And Rhythm] : heart rate and rhythm were normal [Heart Sounds] : normal S1 and S2 [Murmurs] : no murmurs present [Abdomen Soft] : soft [Edema] : no peripheral edema present [Gait - Sufficient For Exercise Testing] : the gait was sufficient for exercise testing [Abdomen Tenderness] : non-tender [Abnormal Walk] : normal gait [Nail Clubbing] : no clubbing of the fingernails [Cyanosis, Localized] : no localized cyanosis [Skin Color & Pigmentation] : normal skin color and pigmentation [] : no rash [Oriented To Time, Place, And Person] : oriented to person, place, and time [Affect] : the affect was normal [Mood] : the mood was normal [No Anxiety] : not feeling anxious

## 2019-03-20 NOTE — HISTORY OF PRESENT ILLNESS
[FreeTextEntry1] : Doing okay. Denies chest pain, shortness of breath or palpitations. Denies any urinary symptoms. Occasional runny nose. Also feels intolerance to cold weather.

## 2019-03-21 ENCOUNTER — TRANSCRIPTION ENCOUNTER (OUTPATIENT)
Age: 69
End: 2019-03-21

## 2019-11-06 ENCOUNTER — NON-APPOINTMENT (OUTPATIENT)
Age: 69
End: 2019-11-06

## 2019-11-06 ENCOUNTER — APPOINTMENT (OUTPATIENT)
Dept: CARDIOLOGY | Facility: CLINIC | Age: 69
End: 2019-11-06
Payer: MEDICARE

## 2019-11-06 VITALS
SYSTOLIC BLOOD PRESSURE: 133 MMHG | HEART RATE: 62 BPM | BODY MASS INDEX: 27.43 KG/M2 | WEIGHT: 181 LBS | HEIGHT: 68 IN | DIASTOLIC BLOOD PRESSURE: 77 MMHG | OXYGEN SATURATION: 96 %

## 2019-11-06 PROCEDURE — 93000 ELECTROCARDIOGRAM COMPLETE: CPT

## 2019-11-06 PROCEDURE — 99214 OFFICE O/P EST MOD 30 MIN: CPT

## 2019-11-06 RX ORDER — ASPIRIN ENTERIC COATED TABLETS 81 MG 81 MG/1
81 TABLET, DELAYED RELEASE ORAL DAILY
Refills: 3 | Status: ACTIVE | COMMUNITY

## 2019-11-07 LAB
ANION GAP SERPL CALC-SCNC: 17 MMOL/L
BUN SERPL-MCNC: 20 MG/DL
CALCIUM SERPL-MCNC: 9.6 MG/DL
CHLORIDE SERPL-SCNC: 103 MMOL/L
CO2 SERPL-SCNC: 22 MMOL/L
CREAT SERPL-MCNC: 1.04 MG/DL
GLUCOSE SERPL-MCNC: 58 MG/DL
POTASSIUM SERPL-SCNC: 4 MMOL/L
PSA SERPL-MCNC: 6.57 NG/ML
SODIUM SERPL-SCNC: 142 MMOL/L

## 2019-11-13 NOTE — PHYSICAL EXAM
[General Appearance - Well Developed] : well developed [Normal Appearance] : normal appearance [Well Groomed] : well groomed [General Appearance - Well Nourished] : well nourished [No Deformities] : no deformities [General Appearance - In No Acute Distress] : no acute distress [Normal Conjunctiva] : the conjunctiva exhibited no abnormalities [Eyelids - No Xanthelasma] : the eyelids demonstrated no xanthelasmas [Normal Oral Mucosa] : normal oral mucosa [No Oral Pallor] : no oral pallor [No Oral Cyanosis] : no oral cyanosis [Respiration, Rhythm And Depth] : normal respiratory rhythm and effort [Exaggerated Use Of Accessory Muscles For Inspiration] : no accessory muscle use [Auscultation Breath Sounds / Voice Sounds] : lungs were clear to auscultation bilaterally [Heart Rate And Rhythm] : heart rate and rhythm were normal [Heart Sounds] : normal S1 and S2 [Murmurs] : no murmurs present [Edema] : no peripheral edema present [Abdomen Soft] : soft [Abdomen Tenderness] : non-tender [Abnormal Walk] : normal gait [Gait - Sufficient For Exercise Testing] : the gait was sufficient for exercise testing [Nail Clubbing] : no clubbing of the fingernails [Cyanosis, Localized] : no localized cyanosis [Skin Color & Pigmentation] : normal skin color and pigmentation [] : no rash [Oriented To Time, Place, And Person] : oriented to person, place, and time [Affect] : the affect was normal [Mood] : the mood was normal [No Anxiety] : not feeling anxious [FreeTextEntry1] : no carotid bruits or JVD

## 2019-11-13 NOTE — DISCUSSION/SUMMARY
[Coronary Artery Disease] : coronary artery disease [Cardiomyopathy] : cardiomyopathy [Stable] : stable [FreeTextEntry1] : \par Currently stable from a cardiovascular standpoint. Normotensive. History of ischemic cardiomyopathy (LVEF 45-50%). Currently euvolemic. Stable CAD (s/p RCA stent). Asymptomatic. Continue current medications. ECG completed today and reviewed. Follow up in 6 months. Will check labs on our next visit.

## 2019-12-13 ENCOUNTER — RX RENEWAL (OUTPATIENT)
Age: 69
End: 2019-12-13

## 2020-01-31 ENCOUNTER — RX RENEWAL (OUTPATIENT)
Age: 70
End: 2020-01-31

## 2020-05-13 ENCOUNTER — APPOINTMENT (OUTPATIENT)
Dept: CARDIOLOGY | Facility: CLINIC | Age: 70
End: 2020-05-13

## 2020-05-13 ENCOUNTER — APPOINTMENT (OUTPATIENT)
Dept: CARDIOLOGY | Facility: CLINIC | Age: 70
End: 2020-05-13
Payer: MEDICARE

## 2020-05-13 PROCEDURE — 99213 OFFICE O/P EST LOW 20 MIN: CPT | Mod: 95

## 2020-05-13 NOTE — DISCUSSION/SUMMARY
[Stable] : stable [Coronary Artery Disease] : coronary artery disease [FreeTextEntry1] : \par Currently stable from a cardiovascular standpoint. History of ischemic cardiomyopathy. Currently asymptomatic. Stable CAD. Continue current medications. Follow up in 3-4 months.

## 2020-05-13 NOTE — HISTORY OF PRESENT ILLNESS
[FreeTextEntry1] : Doing okay. Denies chest pain, shortness of breath or palpitations. Does not appear to have any issues.

## 2020-05-13 NOTE — REASON FOR VISIT
[Home] : at home, [unfilled] , at the time of the visit. [Medical Office: ([facility])___] : at the medical office located in  [Follow-Up - Clinic] : a clinic follow-up of [Coronary Artery Disease] : coronary artery disease

## 2020-05-22 ENCOUNTER — TRANSCRIPTION ENCOUNTER (OUTPATIENT)
Age: 70
End: 2020-05-22

## 2020-08-04 ENCOUNTER — EMERGENCY (EMERGENCY)
Facility: HOSPITAL | Age: 70
LOS: 1 days | Discharge: HOME | End: 2020-08-04
Attending: EMERGENCY MEDICINE | Admitting: EMERGENCY MEDICINE
Payer: MEDICARE

## 2020-08-04 VITALS
DIASTOLIC BLOOD PRESSURE: 80 MMHG | HEART RATE: 57 BPM | OXYGEN SATURATION: 99 % | RESPIRATION RATE: 16 BRPM | SYSTOLIC BLOOD PRESSURE: 152 MMHG | TEMPERATURE: 99 F

## 2020-08-04 VITALS
DIASTOLIC BLOOD PRESSURE: 60 MMHG | HEART RATE: 60 BPM | RESPIRATION RATE: 16 BRPM | TEMPERATURE: 98 F | OXYGEN SATURATION: 99 % | SYSTOLIC BLOOD PRESSURE: 127 MMHG

## 2020-08-04 DIAGNOSIS — Z90.49 ACQUIRED ABSENCE OF OTHER SPECIFIED PARTS OF DIGESTIVE TRACT: Chronic | ICD-10-CM

## 2020-08-04 LAB
ALBUMIN SERPL ELPH-MCNC: 3.9 G/DL — SIGNIFICANT CHANGE UP (ref 3.3–5)
ALP SERPL-CCNC: 109 U/L — SIGNIFICANT CHANGE UP (ref 40–120)
ALT FLD-CCNC: 32 U/L — SIGNIFICANT CHANGE UP (ref 4–41)
ANION GAP SERPL CALC-SCNC: 12 MMO/L — SIGNIFICANT CHANGE UP (ref 7–14)
AST SERPL-CCNC: 30 U/L — SIGNIFICANT CHANGE UP (ref 4–40)
BASOPHILS # BLD AUTO: 0.02 K/UL — SIGNIFICANT CHANGE UP (ref 0–0.2)
BASOPHILS NFR BLD AUTO: 0.3 % — SIGNIFICANT CHANGE UP (ref 0–2)
BILIRUB SERPL-MCNC: 0.6 MG/DL — SIGNIFICANT CHANGE UP (ref 0.2–1.2)
BUN SERPL-MCNC: 20 MG/DL — SIGNIFICANT CHANGE UP (ref 7–23)
CALCIUM SERPL-MCNC: 9.2 MG/DL — SIGNIFICANT CHANGE UP (ref 8.4–10.5)
CHLORIDE SERPL-SCNC: 109 MMOL/L — HIGH (ref 98–107)
CO2 SERPL-SCNC: 24 MMOL/L — SIGNIFICANT CHANGE UP (ref 22–31)
CREAT SERPL-MCNC: 1.08 MG/DL — SIGNIFICANT CHANGE UP (ref 0.5–1.3)
EOSINOPHIL # BLD AUTO: 0.11 K/UL — SIGNIFICANT CHANGE UP (ref 0–0.5)
EOSINOPHIL NFR BLD AUTO: 1.4 % — SIGNIFICANT CHANGE UP (ref 0–6)
GLUCOSE SERPL-MCNC: 103 MG/DL — HIGH (ref 70–99)
HCT VFR BLD CALC: 40 % — SIGNIFICANT CHANGE UP (ref 39–50)
HGB BLD-MCNC: 13.7 G/DL — SIGNIFICANT CHANGE UP (ref 13–17)
IMM GRANULOCYTES NFR BLD AUTO: 0.3 % — SIGNIFICANT CHANGE UP (ref 0–1.5)
LYMPHOCYTES # BLD AUTO: 1.7 K/UL — SIGNIFICANT CHANGE UP (ref 1–3.3)
LYMPHOCYTES # BLD AUTO: 21.7 % — SIGNIFICANT CHANGE UP (ref 13–44)
MCHC RBC-ENTMCNC: 32.4 PG — SIGNIFICANT CHANGE UP (ref 27–34)
MCHC RBC-ENTMCNC: 34.3 % — SIGNIFICANT CHANGE UP (ref 32–36)
MCV RBC AUTO: 94.6 FL — SIGNIFICANT CHANGE UP (ref 80–100)
MONOCYTES # BLD AUTO: 0.58 K/UL — SIGNIFICANT CHANGE UP (ref 0–0.9)
MONOCYTES NFR BLD AUTO: 7.4 % — SIGNIFICANT CHANGE UP (ref 2–14)
NEUTROPHILS # BLD AUTO: 5.41 K/UL — SIGNIFICANT CHANGE UP (ref 1.8–7.4)
NEUTROPHILS NFR BLD AUTO: 68.9 % — SIGNIFICANT CHANGE UP (ref 43–77)
NRBC # FLD: 0 K/UL — SIGNIFICANT CHANGE UP (ref 0–0)
PLATELET # BLD AUTO: 143 K/UL — LOW (ref 150–400)
PMV BLD: 10.9 FL — SIGNIFICANT CHANGE UP (ref 7–13)
POTASSIUM SERPL-MCNC: 4.3 MMOL/L — SIGNIFICANT CHANGE UP (ref 3.5–5.3)
POTASSIUM SERPL-SCNC: 4.3 MMOL/L — SIGNIFICANT CHANGE UP (ref 3.5–5.3)
PROT SERPL-MCNC: 6.2 G/DL — SIGNIFICANT CHANGE UP (ref 6–8.3)
RBC # BLD: 4.23 M/UL — SIGNIFICANT CHANGE UP (ref 4.2–5.8)
RBC # FLD: 12.3 % — SIGNIFICANT CHANGE UP (ref 10.3–14.5)
SODIUM SERPL-SCNC: 145 MMOL/L — SIGNIFICANT CHANGE UP (ref 135–145)
TROPONIN T, HIGH SENSITIVITY: 10 NG/L — SIGNIFICANT CHANGE UP (ref ?–14)
TROPONIN T, HIGH SENSITIVITY: 9 NG/L — SIGNIFICANT CHANGE UP (ref ?–14)
WBC # BLD: 7.84 K/UL — SIGNIFICANT CHANGE UP (ref 3.8–10.5)
WBC # FLD AUTO: 7.84 K/UL — SIGNIFICANT CHANGE UP (ref 3.8–10.5)

## 2020-08-04 PROCEDURE — 99285 EMERGENCY DEPT VISIT HI MDM: CPT | Mod: 25,GC

## 2020-08-04 PROCEDURE — 71046 X-RAY EXAM CHEST 2 VIEWS: CPT | Mod: 26

## 2020-08-04 PROCEDURE — 93010 ELECTROCARDIOGRAM REPORT: CPT

## 2020-08-04 RX ORDER — FAMOTIDINE 10 MG/ML
20 INJECTION INTRAVENOUS ONCE
Refills: 0 | Status: COMPLETED | OUTPATIENT
Start: 2020-08-04 | End: 2020-08-04

## 2020-08-04 RX ORDER — ASPIRIN/CALCIUM CARB/MAGNESIUM 324 MG
162 TABLET ORAL ONCE
Refills: 0 | Status: COMPLETED | OUTPATIENT
Start: 2020-08-04 | End: 2020-08-04

## 2020-08-04 RX ADMIN — FAMOTIDINE 20 MILLIGRAM(S): 10 INJECTION INTRAVENOUS at 04:13

## 2020-08-04 RX ADMIN — Medication 30 MILLILITER(S): at 04:13

## 2020-08-04 RX ADMIN — Medication 162 MILLIGRAM(S): at 04:13

## 2020-08-04 NOTE — ED PROVIDER NOTE - NS ED ROS FT
REVIEW OF SYSTEMS:  General: no fever, no chills  HEENT: no headache, no vision changes  Cardiac: +chest pain, no palpitations  Respiratory: no cough, no shortness of breath  Gastrointestinal: no abdominal pain, no nausea, no vomiting, no diarrhea  Genitourinary: no hematuria, no dysuria, no urinary frequency  Extremities: no extremity swelling, no extremity pain  Neuro: no focal weakness, no numbness/tingling of the extremities, no decreased sensation  Heme: no easy bleeding, no easy bruising  Skin: no jaundice,  no rashes, no lesions  All other ROS as documented in HPI  -Kevin Patino, PGY-3

## 2020-08-04 NOTE — ED PROVIDER NOTE - PATIENT PORTAL LINK FT
You can access the FollowMyHealth Patient Portal offered by [facility] by registering at the following website: [email or web address]. By joining [facility] FollowMyHealth portal, you will also be able to view your health information using other applications (apps) compatible with our system.

## 2020-08-04 NOTE — ED PROVIDER NOTE - PHYSICAL EXAMINATION
General: Well developed, well nourished  HEENT: Normocephalic and atraumatic, EOMI, Trachea midline.   Cardiac: Normal S1 and S2 w/ RRR. No MRG.  Pulmonary: CTA bilaterally. No increased WOB.   Abdominal: Soft, NTND  Neurologic: No focal sensory or motor deficits.  Musculoskeletal: No limited ROM.  Vascular: Warm and well perfused  Skin: Color appropriate for race.   Psychiatric: Appropriate mood and affect. No apparent risk to self or others.  Kevin Patino M.D. PGY-3

## 2020-08-04 NOTE — ED ADULT NURSE NOTE - OBJECTIVE STATEMENT
Pt received, c/o waking up from sleep with diaphoresis, tingling throughout body and right side chest pain that radiated to bilateral shoulders. Pt reports the symptoms resolved at this time. A&Ox4. No obvious distress at this time. MD at bedside for eval. Will monitor.

## 2020-08-04 NOTE — ED PROVIDER NOTE - OBJECTIVE STATEMENT
70M presents with chest pain. Pt states overnight at ~3am he awoke from sleep with a chest discomfort. States it was a R sided chest pain (mild) that radiated to his back. Was associated with feelings of nausea, diaphoresis and indigestion. States this pain lasted for aprox 15 mins and resolved completely. Did not take asa this AM. Unknown last stress. Follows with Dr. Palomares

## 2020-08-04 NOTE — ED ADULT TRIAGE NOTE - CHIEF COMPLAINT QUOTE
Pt comes in for symptoms of waking up from sleep about 30min ago to diaphoresis, tingling throughout body and right side chest pain that radiated to bilateral shoulders. Pt reports the symptoms were there and then they went away on its own. Currently not c/o of any symptoms, but came in fr evaluation. Hx of MI 3yrs ago and x1 stent, EKG to be completed.

## 2020-08-04 NOTE — ED PROVIDER NOTE - ATTENDING CONTRIBUTION TO CARE
Afebrile. Awake and Alert. Lungs CTA. Heart RRR. Abdomen soft NTND. CN II-XII grossly intact. Moves all extremities without lateralization.    r/o ACS: h/o CAD s/p stent, last Stress test was November per pt, no acute ischemic morphology on EKG  r/o CHF: No signs fluid overload, no SOB

## 2020-08-04 NOTE — ED PROVIDER NOTE - NSFOLLOWUPINSTRUCTIONS_ED_ALL_ED_FT
You were seen in the Emergency Department (ED) for chest pain. You were offered further cardiac workup in the observation unit, but you declined and prefer to follow up with Dr. Palomares. Please call office to make an appointment.     Take you home medications as prescribed.     Please follow up with your cardiologist Dr. Palomares. If you have issues obtaining follow up, please call: [phone] to obtain a doctor or specialist who takes your insurance in your area.    Please return to the ED if you experience any new or concerning symptoms, such as: chest pain, difficulty breathing, passing out, unable to move or feel part of your body, fever, chills.     Thank you for visiting a [facility] ED.

## 2020-08-04 NOTE — ED PROVIDER NOTE - PROGRESS NOTE DETAILS
Johana Barth M.D. Resident  Pt signed out to me pending rpt trop (sent). Pt comfortable in bed, no chest pain, agrees to plan for rpt trop and follow up outpt with cardiologist Dr. Palomares. Johana Barth M.D. Resident  Trop 10-->9. Spoke to Dr. Palomares, aware of chest pain episode ED workup. Agrees to extend close followup outpt. Patient informed to call Dr. Palomares office to set up appointment for this week or next.

## 2020-08-04 NOTE — ED ADULT NURSE NOTE - NS ED NURSE RECORD ANOTHER HT AND WT
Continue current medications. Healthy diet. Do blood work one week prior next visit. Call [phone] to schedule  echo of the heart. Use eardrops as needed with caution. Yes

## 2020-08-04 NOTE — ED PROVIDER NOTE - CLINICAL SUMMARY MEDICAL DECISION MAKING FREE TEXT BOX
70M presents with chest pain. Likely MSK vs GI/reflux. Will rule out cardiac causes, pt has risk factors for MI. EKG nl. Labs, 3hr troponins. Will likely recommend stress vs close f/u with cards if ER w/u nl

## 2020-09-09 ENCOUNTER — NON-APPOINTMENT (OUTPATIENT)
Age: 70
End: 2020-09-09

## 2020-09-09 ENCOUNTER — APPOINTMENT (OUTPATIENT)
Dept: CARDIOLOGY | Facility: CLINIC | Age: 70
End: 2020-09-09
Payer: MEDICARE

## 2020-09-09 VITALS
WEIGHT: 180 LBS | OXYGEN SATURATION: 96 % | RESPIRATION RATE: 16 BRPM | DIASTOLIC BLOOD PRESSURE: 72 MMHG | BODY MASS INDEX: 27.28 KG/M2 | SYSTOLIC BLOOD PRESSURE: 111 MMHG | HEART RATE: 54 BPM | HEIGHT: 68 IN | TEMPERATURE: 96.4 F

## 2020-09-09 PROCEDURE — 93000 ELECTROCARDIOGRAM COMPLETE: CPT

## 2020-09-09 PROCEDURE — 99213 OFFICE O/P EST LOW 20 MIN: CPT

## 2020-09-09 NOTE — PHYSICAL EXAM
[General Appearance - Well Developed] : well developed [Normal Appearance] : normal appearance [Well Groomed] : well groomed [General Appearance - Well Nourished] : well nourished [No Deformities] : no deformities [General Appearance - In No Acute Distress] : no acute distress [Normal Conjunctiva] : the conjunctiva exhibited no abnormalities [Normal Oral Mucosa] : normal oral mucosa [Eyelids - No Xanthelasma] : the eyelids demonstrated no xanthelasmas [No Oral Pallor] : no oral pallor [No Oral Cyanosis] : no oral cyanosis [Respiration, Rhythm And Depth] : normal respiratory rhythm and effort [Auscultation Breath Sounds / Voice Sounds] : lungs were clear to auscultation bilaterally [Heart Rate And Rhythm] : heart rate and rhythm were normal [Exaggerated Use Of Accessory Muscles For Inspiration] : no accessory muscle use [Murmurs] : no murmurs present [Edema] : no peripheral edema present [Heart Sounds] : normal S1 and S2 [Abdomen Soft] : soft [Abdomen Tenderness] : non-tender [Gait - Sufficient For Exercise Testing] : the gait was sufficient for exercise testing [Abnormal Walk] : normal gait [Nail Clubbing] : no clubbing of the fingernails [Cyanosis, Localized] : no localized cyanosis [] : no rash [Oriented To Time, Place, And Person] : oriented to person, place, and time [Skin Color & Pigmentation] : normal skin color and pigmentation [No Anxiety] : not feeling anxious [Affect] : the affect was normal [Mood] : the mood was normal [FreeTextEntry1] : no carotid bruits or JVD

## 2020-09-09 NOTE — DISCUSSION/SUMMARY
[Cardiomyopathy] : cardiomyopathy [Coronary Artery Disease] : coronary artery disease [Stable] : stable [FreeTextEntry1] : \par Currently stable from a cardiovascular standpoint. Normotensive. History of ischemic cardiomyopathy. Currently euvolemic. Stable CAD. Asymptomatic. Continue current medications. ECG completed today and reviewed (findings as noted above). Will check labs today. Follow up in 6 months.

## 2020-09-10 LAB
ALBUMIN SERPL ELPH-MCNC: 4.1 G/DL
ALP BLD-CCNC: 106 U/L
ALT SERPL-CCNC: 27 U/L
ANION GAP SERPL CALC-SCNC: 15 MMOL/L
AST SERPL-CCNC: 29 U/L
BASOPHILS # BLD AUTO: 0.02 K/UL
BASOPHILS NFR BLD AUTO: 0.3 %
BILIRUB DIRECT SERPL-MCNC: 0.2 MG/DL
BILIRUB INDIRECT SERPL-MCNC: 0.7 MG/DL
BILIRUB SERPL-MCNC: 0.8 MG/DL
BUN SERPL-MCNC: 17 MG/DL
CALCIUM SERPL-MCNC: 9.2 MG/DL
CHLORIDE SERPL-SCNC: 108 MMOL/L
CHOLEST SERPL-MCNC: 112 MG/DL
CHOLEST/HDLC SERPL: 3.1 RATIO
CO2 SERPL-SCNC: 23 MMOL/L
CREAT SERPL-MCNC: 1.03 MG/DL
EOSINOPHIL # BLD AUTO: 0.07 K/UL
EOSINOPHIL NFR BLD AUTO: 1 %
ESTIMATED AVERAGE GLUCOSE: 103 MG/DL
GLUCOSE SERPL-MCNC: 70 MG/DL
HBA1C MFR BLD HPLC: 5.2 %
HCT VFR BLD CALC: 40.9 %
HDLC SERPL-MCNC: 36 MG/DL
HGB BLD-MCNC: 14 G/DL
IMM GRANULOCYTES NFR BLD AUTO: 0.3 %
LDLC SERPL CALC-MCNC: 47 MG/DL
LYMPHOCYTES # BLD AUTO: 2.04 K/UL
LYMPHOCYTES NFR BLD AUTO: 28.9 %
MAN DIFF?: NORMAL
MCHC RBC-ENTMCNC: 33.6 PG
MCHC RBC-ENTMCNC: 34.2 GM/DL
MCV RBC AUTO: 98.1 FL
MONOCYTES # BLD AUTO: 0.52 K/UL
MONOCYTES NFR BLD AUTO: 7.4 %
NEUTROPHILS # BLD AUTO: 4.38 K/UL
NEUTROPHILS NFR BLD AUTO: 62.1 %
PLATELET # BLD AUTO: 157 K/UL
POTASSIUM SERPL-SCNC: 4 MMOL/L
PROT SERPL-MCNC: 6.6 G/DL
PSA SERPL-MCNC: 7.99 NG/ML
RBC # BLD: 4.17 M/UL
RBC # FLD: 12.7 %
SODIUM SERPL-SCNC: 146 MMOL/L
TRIGL SERPL-MCNC: 144 MG/DL
WBC # FLD AUTO: 7.05 K/UL

## 2020-10-05 ENCOUNTER — APPOINTMENT (OUTPATIENT)
Dept: UROLOGY | Facility: CLINIC | Age: 70
End: 2020-10-05
Payer: MEDICARE

## 2020-10-05 VITALS
HEART RATE: 55 BPM | WEIGHT: 171 LBS | SYSTOLIC BLOOD PRESSURE: 124 MMHG | RESPIRATION RATE: 16 BRPM | BODY MASS INDEX: 25.91 KG/M2 | HEIGHT: 68 IN | DIASTOLIC BLOOD PRESSURE: 79 MMHG

## 2020-10-05 VITALS — TEMPERATURE: 97.7 F

## 2020-10-05 PROCEDURE — 99204 OFFICE O/P NEW MOD 45 MIN: CPT

## 2020-10-05 NOTE — ADDENDUM
[FreeTextEntry1] : Entered by Parag Mendoza, acting as scribe for Dr. Silvio Shi.\par \par The documentation recorded by the scribe accurately reflects the service I personally performed and the decisions made by me.\par

## 2020-10-05 NOTE — LETTER BODY
[FreeTextEntry1] : Reason for Visit: Elevated PSA.\par \par This is a 70 year-old gentleman with a history of CAD, dyslipidemia, and ischemic cardiomyopathy, presenting with elevated PSA. Patient reports that he has not had previous prostate biopsy. His current PSA is 7.99.  Patient denies any hematuria or lower urinary tract symptoms. He denies any pain.The patient denies any aggravating or relieving factors. The patient denies any interference of function. The patient is entirely asymptomatic. All other review of systems are negative. He has no cancer in his family medical history. He has no previous surgical history. Past medical history, family history and social history were inquired and were noncontributory to current condition. The patient does not use tobacco or drink alcohol. Medications and allergies were reviewed. He has no known allergies to medication. \par \par On examination, the patient is an older-appearing gentleman in no acute distress. He is alert and oriented follows commands. He has normal mood and affect. He is normocephalic. Neck is supple. Oral no thrush. Respirations are unlabored. His abdomen is soft and nontender. Bladder is nonpalpable. No CVA tenderness. Neurologically he is grossly intact. No peripheral edema. Skin without gross abnormality. He has normal male external genitalia. Normal meatus. Bilateral testes are descended intrascrotally and normal to palpation. On rectal examination, there is normal sphincter tone. The prostate is clinically benign without focal induration or nodularity.\par \par His recent BMP demonstrated normal renal functions, creatinine 1.03. His PSA was 7,99, which is elevated.\par \par Assessment: Elevated PSA.\par \par I counseled the patient. I discussed with him the risk of occult malignancy. I recommended he obtain a prostate MRI for further evaluation of his condition.  I counseled the patient regarding the procedure. The risks and benefits were discussed. Alternatives were given. I answered the patient questions. The patient will take the necessary preparations for the procedure, including fleet enema. I also recommended he consider obtaining a prostate biopsy. He will repeat BMP and PSA today to ensure stability. Risks and alternatives were discussed. I answered the patient questions. He will take the necessary preparations for the procedure. The patient will follow-up as directed and will contact me with any questions or concerns. Thank you for the opportunity to participate in the care of Mr. CR. I will keep you updated on his progress.\par \par Plan: Prostate MRI. Fleet enema. BMP. PSA. Consider prostate biopsy. Follow-up as directed.

## 2020-12-31 ENCOUNTER — APPOINTMENT (OUTPATIENT)
Dept: MRI IMAGING | Facility: IMAGING CENTER | Age: 70
End: 2020-12-31
Payer: MEDICARE

## 2020-12-31 ENCOUNTER — RESULT REVIEW (OUTPATIENT)
Age: 70
End: 2020-12-31

## 2020-12-31 ENCOUNTER — OUTPATIENT (OUTPATIENT)
Dept: OUTPATIENT SERVICES | Facility: HOSPITAL | Age: 70
LOS: 1 days | End: 2020-12-31
Payer: MEDICARE

## 2020-12-31 DIAGNOSIS — I25.10 ATHEROSCLEROTIC HEART DISEASE OF NATIVE CORONARY ARTERY WITHOUT ANGINA PECTORIS: ICD-10-CM

## 2020-12-31 DIAGNOSIS — Z90.49 ACQUIRED ABSENCE OF OTHER SPECIFIED PARTS OF DIGESTIVE TRACT: Chronic | ICD-10-CM

## 2020-12-31 DIAGNOSIS — E78.5 HYPERLIPIDEMIA, UNSPECIFIED: ICD-10-CM

## 2020-12-31 DIAGNOSIS — I25.5 ISCHEMIC CARDIOMYOPATHY: ICD-10-CM

## 2020-12-31 PROCEDURE — 72197 MRI PELVIS W/O & W/DYE: CPT

## 2020-12-31 PROCEDURE — 76377 3D RENDER W/INTRP POSTPROCES: CPT | Mod: 26

## 2020-12-31 PROCEDURE — A9585: CPT

## 2020-12-31 PROCEDURE — 76377 3D RENDER W/INTRP POSTPROCES: CPT

## 2020-12-31 PROCEDURE — 72197 MRI PELVIS W/O & W/DYE: CPT | Mod: 26

## 2021-01-05 ENCOUNTER — NON-APPOINTMENT (OUTPATIENT)
Age: 71
End: 2021-01-05

## 2021-01-28 ENCOUNTER — APPOINTMENT (OUTPATIENT)
Dept: UROLOGY | Facility: CLINIC | Age: 71
End: 2021-01-28
Payer: MEDICARE

## 2021-01-28 ENCOUNTER — OUTPATIENT (OUTPATIENT)
Dept: OUTPATIENT SERVICES | Facility: HOSPITAL | Age: 71
LOS: 1 days | End: 2021-01-28
Payer: MEDICARE

## 2021-01-28 VITALS — SYSTOLIC BLOOD PRESSURE: 154 MMHG | DIASTOLIC BLOOD PRESSURE: 81 MMHG | HEART RATE: 65 BPM

## 2021-01-28 VITALS — HEART RATE: 57 BPM | SYSTOLIC BLOOD PRESSURE: 146 MMHG | DIASTOLIC BLOOD PRESSURE: 84 MMHG

## 2021-01-28 DIAGNOSIS — Z90.49 ACQUIRED ABSENCE OF OTHER SPECIFIED PARTS OF DIGESTIVE TRACT: Chronic | ICD-10-CM

## 2021-01-28 DIAGNOSIS — R35.0 FREQUENCY OF MICTURITION: ICD-10-CM

## 2021-01-28 PROCEDURE — 76872 US TRANSRECTAL: CPT

## 2021-01-28 PROCEDURE — 55700: CPT

## 2021-01-28 PROCEDURE — 76872 US TRANSRECTAL: CPT | Mod: 26

## 2021-01-28 PROCEDURE — 76942 ECHO GUIDE FOR BIOPSY: CPT | Mod: 26,59

## 2021-01-28 PROCEDURE — 76942 ECHO GUIDE FOR BIOPSY: CPT | Mod: 59

## 2021-01-28 PROCEDURE — 76377 3D RENDER W/INTRP POSTPROCES: CPT | Mod: 26

## 2021-01-29 ENCOUNTER — NON-APPOINTMENT (OUTPATIENT)
Age: 71
End: 2021-01-29

## 2021-02-01 DIAGNOSIS — R97.20 ELEVATED PROSTATE SPECIFIC ANTIGEN [PSA]: ICD-10-CM

## 2021-02-01 LAB — CORE LAB BIOPSY: NORMAL

## 2021-02-08 ENCOUNTER — APPOINTMENT (OUTPATIENT)
Dept: UROLOGY | Facility: CLINIC | Age: 71
End: 2021-02-08
Payer: MEDICARE

## 2021-02-08 PROCEDURE — 99214 OFFICE O/P EST MOD 30 MIN: CPT

## 2021-02-10 ENCOUNTER — APPOINTMENT (OUTPATIENT)
Dept: RADIATION ONCOLOGY | Facility: CLINIC | Age: 71
End: 2021-02-10
Payer: MEDICARE

## 2021-02-10 ENCOUNTER — OUTPATIENT (OUTPATIENT)
Dept: OUTPATIENT SERVICES | Facility: HOSPITAL | Age: 71
LOS: 1 days | Discharge: HOME | End: 2021-02-10
Payer: MEDICARE

## 2021-02-10 VITALS
WEIGHT: 163.69 LBS | OXYGEN SATURATION: 98 % | RESPIRATION RATE: 16 BRPM | TEMPERATURE: 96 F | BODY MASS INDEX: 24.89 KG/M2 | SYSTOLIC BLOOD PRESSURE: 152 MMHG | HEART RATE: 100 BPM | DIASTOLIC BLOOD PRESSURE: 85 MMHG

## 2021-02-10 DIAGNOSIS — Z90.49 ACQUIRED ABSENCE OF OTHER SPECIFIED PARTS OF DIGESTIVE TRACT: Chronic | ICD-10-CM

## 2021-02-10 PROCEDURE — 99205 OFFICE O/P NEW HI 60 MIN: CPT | Mod: 25

## 2021-02-10 NOTE — HISTORY OF PRESENT ILLNESS
[FreeTextEntry1] : 71 year old man seen for consideration of radical radiation for prostate cancer\par \par Diagnosis: PSA 7.99mg/mL, G3+4=7, 7/14 cores involved, up to 80%,  T3a (extracapsular extension bilaterally)\par \par Patient presents with a screen detected malignancy, he has no symptoms of disease. No urinary symptoms or bowel symptoms. He is not sexually active.\par \par History of presenting complaint:\par \par 9/9/20: 2020 PSA 7.99ng/mL\par \par 12/31/20: MRI 16cc gland, large very high suspiciou lesion bilaterally with bulging of the capsule bilaterally and extension into the right neurovascular bundle on the right\par \par 1/29/21 Biopsy G3+4=7, 7/14 cores involved up to 80%\par \par PMH: IHD, previous history of mild MI\par

## 2021-02-10 NOTE — LETTER BODY
[FreeTextEntry1] : The patient does not have a PCP.\par \par Reason for visit: Prostate cancer. \par \par This is a 71 year-old gentleman with elevated PSA, status post prostate biopsy. Patient returns today to discuss the results of the biopsy. Patient denies any side effects of difficulty of the prostate biopsy. He has some mild hematuria which is improving. He denies any fevers or chills. He denies any pain.Patient denies any changes in health. The past medical history and family history and social history are unchanged. All other review of systems are negative. Patient denies any changes in medications. Medication list was reconciled.\par \par On examination, the patient is a healthy-appearing gentleman in no acute distress. He is alert and oriented follows commands. He has normal mood and affect. He is normocephalic. Neck is supple. Oral no thrush Respirations are unlabored. His abdomen is soft and nontender. Bladder is nonpalpable. No CVA tenderness. Neurologically he is grossly intact. No peripheral edema. Skin without gross abnormality. He has normal male external genitalia. Normal meatus. Bilateral testes are descended intrascrotally and normal to palpation. On rectal examination, there is normal sphincter tone. The prostate is clinically benign without focal induration or nodularity.\par \par Prostate biopsy demonstrated evidence of Dorr 7 adenocarcinoma of the prostate involving 6 of 18 cores, and Cesia 6 adenocarcinoma of the prostate involving 2 cores.\par \par Assessment: Prostate cancer\par \par I counseled the patient on its clinical significance. I discussed the risk of metastatic disease and the probability of organ confined disease. I discussed the treatment options available to him including expectant management, hormonal therapy, radiation, and surgery. The risks and benefits of each options were discussed in detail as well quality of life issues. I answered his questions. He wishes to consider radiation. Risks and benefits were discussed. Alternatives were given. I answered the patient questions. The patient wishes to consider the procedure and discuss with his family. The patient will follow-up as directed and will contact me with any questions or concerns or he has made a decision regarding the treatment of his prostate cancer.\par \par Plan: Consider radiation. Followup in 1 month.

## 2021-02-10 NOTE — VITALS
[80: Normal activity with effort; some signs or symptoms of disease.] : 80: Normal activity with effort; some signs or symptoms of disease.  [Maximal Pain Intensity: 0/10] : 0/10 [Least Pain Intensity: 0/10] : 0/10 [90: Able to carry normal activity; minor signs or symptoms of disease.] : 90: Able to carry normal activity; minor signs or symptoms of disease.  [ECOG Performance Status: 0 - Fully active, able to carry on all pre-disease performance without restriction] : Performance Status: 0 - Fully active, able to carry on all pre-disease performance without restriction

## 2021-02-10 NOTE — REVIEW OF SYSTEMS
[Negative] : Neurological [Constipation: Grade 0] : Constipation: Grade 0 [Diarrhea: Grade 0] : Diarrhea: Grade 0 [Dysphagia: Grade 0] : Dysphagia: Grade 0 [Hematuria: Grade 0] : Hematuria: Grade 0

## 2021-02-11 ENCOUNTER — APPOINTMENT (OUTPATIENT)
Dept: UROLOGY | Facility: CLINIC | Age: 71
End: 2021-02-11
Payer: MEDICARE

## 2021-02-11 PROCEDURE — 99214 OFFICE O/P EST MOD 30 MIN: CPT

## 2021-02-11 NOTE — DISCHARGE NOTE ADULT - NS AS ACTIVITY OBS
Sex allowed/Return to Work/School allowed/Driving allowed/Walking-Indoors allowed/Showering allowed/No Heavy lifting/straining/Stairs allowed/Walking-Outdoors allowed yes...

## 2021-02-11 NOTE — LETTER BODY
[FreeTextEntry1] : The patient does not have a PCP.\par \par Reason for visit: Prostate cancer. \par \par This is a 71 year-old gentleman with elevated PSA, status post positive prostate biopsy. His prostate biopsy in January demonstrated evidence of Cesia 6/7 adenocarcinoma of the prostate involving 8 of 18 cores. The patient returns today for follow-up. Since his last visit, the patient reports that he consulted with radiation oncologist, Dr. Chrissy Grubbs. Patient denies any pain. Patient denies any changes in health. The past medical history and family history and social history are unchanged. All other review of systems are negative. Patient denies any changes in medications. Medication list was reconciled.\par \par On examination, the patient is a healthy-appearing gentleman in no acute distress. He is alert and oriented follows commands. He has normal mood and affect. He is normocephalic. Neck is supple. Oral no thrush Respirations are unlabored. His abdomen is soft and nontender. Bladder is nonpalpable. No CVA tenderness. Neurologically he is grossly intact. No peripheral edema. Skin without gross abnormality. He has normal male external genitalia. Normal meatus. Bilateral testes are descended intrascrotally and normal to palpation. On rectal examination, there is normal sphincter tone. The prostate is clinically benign without focal induration or nodularity.\par \par Assessment: Prostate cancer.\par \par I counseled the patient. I reviewed the treatment options available to the patient, including radiation versus hormonal therapy. The risks and benefits of each of the options were discussed. I answered his questions. The patient will consider hormone therapy. Risks and alternatives were discussed. I answered the patient questions. The patient will follow-up as directed and will contact me with any questions or concerns or he has made a decision regarding the treatment of his prostate cancer. Thank you for the opportunity to participate in the care of Mr. CR. I will keep you updated on his progress.\par \par Plan: Consider hormone therapy. Follow-up as directed.

## 2021-02-17 ENCOUNTER — APPOINTMENT (OUTPATIENT)
Dept: NUCLEAR MEDICINE | Facility: IMAGING CENTER | Age: 71
End: 2021-02-17
Payer: MEDICARE

## 2021-02-17 ENCOUNTER — OUTPATIENT (OUTPATIENT)
Dept: OUTPATIENT SERVICES | Facility: HOSPITAL | Age: 71
LOS: 1 days | End: 2021-02-17
Payer: MEDICARE

## 2021-02-17 DIAGNOSIS — Z90.49 ACQUIRED ABSENCE OF OTHER SPECIFIED PARTS OF DIGESTIVE TRACT: Chronic | ICD-10-CM

## 2021-02-17 DIAGNOSIS — C61 MALIGNANT NEOPLASM OF PROSTATE: ICD-10-CM

## 2021-02-17 PROCEDURE — A9561: CPT

## 2021-02-17 PROCEDURE — G1004: CPT

## 2021-02-17 PROCEDURE — 78306 BONE IMAGING WHOLE BODY: CPT | Mod: 26,MG

## 2021-02-17 PROCEDURE — 77263 THER RADIOLOGY TX PLNG CPLX: CPT

## 2021-02-17 PROCEDURE — 78306 BONE IMAGING WHOLE BODY: CPT

## 2021-03-10 ENCOUNTER — APPOINTMENT (OUTPATIENT)
Dept: CARDIOLOGY | Facility: CLINIC | Age: 71
End: 2021-03-10
Payer: MEDICARE

## 2021-03-10 ENCOUNTER — NON-APPOINTMENT (OUTPATIENT)
Age: 71
End: 2021-03-10

## 2021-03-10 VITALS
RESPIRATION RATE: 16 BRPM | SYSTOLIC BLOOD PRESSURE: 145 MMHG | HEIGHT: 68 IN | HEART RATE: 57 BPM | TEMPERATURE: 94.6 F | DIASTOLIC BLOOD PRESSURE: 86 MMHG | WEIGHT: 163 LBS | BODY MASS INDEX: 24.71 KG/M2 | OXYGEN SATURATION: 98 %

## 2021-03-10 VITALS — SYSTOLIC BLOOD PRESSURE: 118 MMHG | DIASTOLIC BLOOD PRESSURE: 73 MMHG

## 2021-03-10 PROCEDURE — 93000 ELECTROCARDIOGRAM COMPLETE: CPT

## 2021-03-10 PROCEDURE — 99214 OFFICE O/P EST MOD 30 MIN: CPT

## 2021-03-10 NOTE — PHYSICAL EXAM
[General Appearance - Well Developed] : well developed [Normal Appearance] : normal appearance [Well Groomed] : well groomed [General Appearance - Well Nourished] : well nourished [No Deformities] : no deformities [General Appearance - In No Acute Distress] : no acute distress [Normal Conjunctiva] : the conjunctiva exhibited no abnormalities [Eyelids - No Xanthelasma] : the eyelids demonstrated no xanthelasmas [Normal Oral Mucosa] : normal oral mucosa [No Oral Pallor] : no oral pallor [No Oral Cyanosis] : no oral cyanosis [FreeTextEntry1] : no carotid bruits or JVD [Respiration, Rhythm And Depth] : normal respiratory rhythm and effort [Exaggerated Use Of Accessory Muscles For Inspiration] : no accessory muscle use [Auscultation Breath Sounds / Voice Sounds] : lungs were clear to auscultation bilaterally [Heart Rate And Rhythm] : heart rate and rhythm were normal [Heart Sounds] : normal S1 and S2 [Murmurs] : no murmurs present [Edema] : no peripheral edema present [Abdomen Soft] : soft [Abdomen Tenderness] : non-tender [Abnormal Walk] : normal gait [Gait - Sufficient For Exercise Testing] : the gait was sufficient for exercise testing [Nail Clubbing] : no clubbing of the fingernails [Cyanosis, Localized] : no localized cyanosis [Skin Color & Pigmentation] : normal skin color and pigmentation [] : no rash [Oriented To Time, Place, And Person] : oriented to person, place, and time [Affect] : the affect was normal [Mood] : the mood was normal [No Anxiety] : not feeling anxious

## 2021-03-10 NOTE — DISCUSSION/SUMMARY
[Cardiomyopathy] : cardiomyopathy [Coronary Artery Disease] : coronary artery disease [Stable] : stable [FreeTextEntry1] : \par Currently stable from a cardiovascular standpoint. Normotensive. History of ischemic cardiomyopathy. Currently euvolemic. Stable CAD (distal RCA stent). No ischemic or CHF symptoms. Continue current medications. ECG completed today and reviewed. Follow up in 6 months.

## 2021-03-10 NOTE — HISTORY OF PRESENT ILLNESS
[FreeTextEntry1] : Doing okay. Was diagnosed with prostate cancer. Anticipating radiation treatment. Denies chest pain, shortness of breath or palpitations.

## 2021-03-11 ENCOUNTER — OUTPATIENT (OUTPATIENT)
Dept: OUTPATIENT SERVICES | Facility: HOSPITAL | Age: 71
LOS: 1 days | End: 2021-03-11
Payer: MEDICARE

## 2021-03-11 ENCOUNTER — APPOINTMENT (OUTPATIENT)
Dept: UROLOGY | Facility: CLINIC | Age: 71
End: 2021-03-11
Payer: MEDICARE

## 2021-03-11 DIAGNOSIS — R35.0 FREQUENCY OF MICTURITION: ICD-10-CM

## 2021-03-11 DIAGNOSIS — E78.5 HYPERLIPIDEMIA, UNSPECIFIED: ICD-10-CM

## 2021-03-11 DIAGNOSIS — Z90.49 ACQUIRED ABSENCE OF OTHER SPECIFIED PARTS OF DIGESTIVE TRACT: Chronic | ICD-10-CM

## 2021-03-11 PROCEDURE — 99213 OFFICE O/P EST LOW 20 MIN: CPT | Mod: 25

## 2021-03-11 PROCEDURE — 96402 CHEMO HORMON ANTINEOPL SQ/IM: CPT

## 2021-03-11 RX ORDER — AMOXICILLIN AND CLAVULANATE POTASSIUM 875; 125 MG/1; MG/1
875-125 TABLET, COATED ORAL
Qty: 6 | Refills: 0 | Status: COMPLETED | COMMUNITY
Start: 2021-02-19 | End: 2021-02-22

## 2021-03-11 RX ORDER — ENEMA 19; 7 G/133ML; G/133ML
7-19 ENEMA RECTAL
Qty: 1 | Refills: 0 | Status: COMPLETED | COMMUNITY
Start: 2021-01-01 | End: 2021-01-28

## 2021-03-11 RX ORDER — CEFDINIR 300 MG/1
300 CAPSULE ORAL
Qty: 6 | Refills: 0 | Status: COMPLETED | COMMUNITY
Start: 2021-01-01 | End: 2021-01-29

## 2021-03-11 NOTE — LETTER BODY
[FreeTextEntry1] : Raman Palomares MD\par [id number]\[place] [address]\par [phone]\par \par Dear Dr. Palomares,\par \par REASON FOR VISIT: Prostate cancer. \par  \par This is a 71 year-old gentleman with prostate cancer. The patient returns today to start Eligard injection. He denies any interval complaints or difficulties. Patient reports no pain. He has been doing well. Patient denies any changes in health. The past medical history and family history and social history are unchanged. All other review of systems are negative. Patient denies any changes in medications. Medication list was reconciled. \par  \par He is currently taking calcium supplements and vitamin D for osteoporosis. \par \par On examination, the patient is a healthy-appearing gentleman in no acute distress. He is alert and oriented follows commands. He has normal mood and affect. He is normocephalic. Oral no thrush. Neck is supple. Respirations are unlabored. His abdomen is soft and nontender. Liver is nonpalpable. Bladder is nonpalpable. No CVA tenderness. Neurologically he is grossly intact. No peripheral edema. Skin without gross abnormality.\par \par The patient's left lower abdomen was cleaned with alcohol, 45 mg Eligard was applied subcutaneously. Patient tolerated procedure well.\par \par Assessment: Prostate cancer on the androgen ablation. \par  \par I counseled the patient. I encourage patient to continue with Eligard as LHRH agonist will help suppress his prostate cancer. He will obtain PSA and testosterone to monitor efficacy of treatment. Risks and alternatives were discussed. I answered the patient questions. The patient will follow-up as directed and will contact me with any questions or concerns. He will return in six months time for Eligard injection. Thank you for the opportunity to participate in the care of this patient. I'll keep you updated on his progress.\par  \par Plan: PSA. Testosterone. Eligard in 6 months.\par \par I spent 20-minutes time today on all issues related to this encounter on today date of service including non face to face time.

## 2021-03-12 ENCOUNTER — NON-APPOINTMENT (OUTPATIENT)
Age: 71
End: 2021-03-12

## 2021-03-12 DIAGNOSIS — C61 MALIGNANT NEOPLASM OF PROSTATE: ICD-10-CM

## 2021-03-16 ENCOUNTER — NON-APPOINTMENT (OUTPATIENT)
Age: 71
End: 2021-03-16

## 2021-03-16 PROCEDURE — 55876 PLACE RT DEVICE/MARKER PROS: CPT

## 2021-03-16 PROCEDURE — 55874 TPRNL PLMT BIODEGRDABL MATRL: CPT

## 2021-03-16 PROCEDURE — 76872 US TRANSRECTAL: CPT | Mod: 26

## 2021-03-16 NOTE — REASON FOR VISIT
[Family Member] : family member [FreeTextEntry2] : transperineal placement of Space OAR gel and markers

## 2021-03-16 NOTE — DATA REVIEWED
[FreeTextEntry1] : In preparation for the procedure, he self-administered an enema one hour before leaving home and was NPO the night before procedure. He was prescribed a 3 days course of oral antibiotics twice daily to be started a day prior to the procedure. \par Topical YUDELKA cream was applied to the perineal area one hour prior to procedure. Patient was prescribed and took Valium 5mg and Tylenol 650 mg upon arrival in the department one hour to procedure.\par  Procedure risk and benefits were reviewed with patient and a written consent was obtained prior to procedure. A time out was observed with patient name, date of birth, procedure, position, and site verified. \par Patient was placed in a lithotomy position. Chloral prep was used to prep the skin. While maintaining aseptic technique, an ultrasound probe was inserted into the rectum to visualize the prostate. Less than 10 cc of Lidocaine 2% and sodium bicarbonate 8.4% was injected subcutaneously. Afterwards, 20 cc of Lidocaine and sodium bicarbonate was injected internally at the prostate apex and bilateral neurovascular bundles for the nerve block.\par Three fiducial markers were prepared on the sterile field. One fiducial marker was placed into each of the following sites: left lobe base, right lobe base, and apex, via 14 gauge needles under ultrasound guidance. \par Next, the hydrogel spacer kit was opened onto the sterile field and the hydrogel injection apparatus was prepared. An 18 gauge needle was positioned into the mid-line perirectal fat between the anterior rectal wall and prostate under ultrasound guidance. Less than 10 cc of saline was injected via the needle to hydrodissect the space and confirm proper placement in both axial and sagittal views. The syringe was aspirated to confirm the needle was extravascular. The syringe was replaced with the hydrogel injection apparatus and the gel was injected over about 12 seconds. The needle was then removed. There was minimal blood loss. The patient tolerated procedure well.\par Patient was transferred to the recovery area on a monitor. Vital signs were stable. He tolerated fluid and a snack by mouth and was made comfortable. He denied pain. Post procedure instruction were given and reviewed with patient. CT/SIM appointment was given. He was discharged home in a stable condition, accompanied by his family member.\par \par \par

## 2021-03-22 NOTE — REASON FOR VISIT
[Routine Follow-Up] : routine follow-up visit for [Consideration of Curative Therapy] : consideration of curative therapy for prostate cancer

## 2021-03-23 ENCOUNTER — NON-APPOINTMENT (OUTPATIENT)
Age: 71
End: 2021-03-23

## 2021-03-23 ENCOUNTER — APPOINTMENT (OUTPATIENT)
Dept: RADIATION ONCOLOGY | Facility: CLINIC | Age: 71
End: 2021-03-23
Payer: MEDICARE

## 2021-03-23 VITALS
SYSTOLIC BLOOD PRESSURE: 147 MMHG | RESPIRATION RATE: 17 BRPM | DIASTOLIC BLOOD PRESSURE: 79 MMHG | BODY MASS INDEX: 24.97 KG/M2 | HEART RATE: 52 BPM | TEMPERATURE: 97.5 F | WEIGHT: 164.24 LBS | OXYGEN SATURATION: 100 %

## 2021-03-23 PROCEDURE — 77332 RADIATION TREATMENT AID(S): CPT | Mod: 26

## 2021-03-23 PROCEDURE — 99212 OFFICE O/P EST SF 10 MIN: CPT | Mod: 25

## 2021-03-23 RX ORDER — DIAZEPAM 5 MG/1
5 TABLET ORAL
Qty: 1 | Refills: 0 | Status: DISCONTINUED | COMMUNITY
Start: 2021-02-19 | End: 2021-03-23

## 2021-03-23 RX ORDER — BICALUTAMIDE 50 MG/1
50 TABLET ORAL
Qty: 30 | Refills: 0 | Status: DISCONTINUED | COMMUNITY
Start: 2021-02-11 | End: 2021-03-23

## 2021-03-23 NOTE — PHYSICAL EXAM
[General Appearance - Alert] : alert [General Appearance - In No Acute Distress] : in no acute distress [Oriented To Time, Place, And Person] : oriented to person, place, and time [Normal] : normoactive bowel sounds, soft and nontender, no hepatosplenomegaly or masses appreciated

## 2021-03-23 NOTE — REVIEW OF SYSTEMS
[Fatigue: Grade 0] : Fatigue: Grade 0 [Hematuria: Grade 0] : Hematuria: Grade 0 [Urinary Urgency: Grade 1 - Present] : Urinary Urgency: Grade 1 - Present [FreeTextEntry5] : dribbling

## 2021-03-23 NOTE — HISTORY OF PRESENT ILLNESS
[FreeTextEntry1] : 71 year old man seen for consideration of radical radiation for prostate cancer\par \par Diagnosis: PSA 7.99mg/mL, G3+4=7, 7/14 cores involved, up to 80%,  T3a (extracapsular extension bilaterally)\par \par 2/19/21: Bone scans negative for osseous metastasis\par \par Returns for examination, discussion of treatment plan and possible acute and late side effects, consent and SIM\par \par Today: Feels well. No complications following SpaceOAR and fiducials insertion. Occasional dribbling.  IPSS/EPIC Score 0/2

## 2021-03-29 PROCEDURE — 77338 DESIGN MLC DEVICE FOR IMRT: CPT | Mod: 26

## 2021-03-29 PROCEDURE — 77301 RADIOTHERAPY DOSE PLAN IMRT: CPT | Mod: 26

## 2021-03-29 PROCEDURE — 77300 RADIATION THERAPY DOSE PLAN: CPT | Mod: 26

## 2021-04-05 PROCEDURE — 77387B: CUSTOM | Mod: 26

## 2021-04-05 PROCEDURE — 77427 RADIATION TX MANAGEMENT X5: CPT

## 2021-04-06 ENCOUNTER — NON-APPOINTMENT (OUTPATIENT)
Age: 71
End: 2021-04-06

## 2021-04-06 PROCEDURE — 77387B: CUSTOM | Mod: 26

## 2021-04-06 NOTE — REVIEW OF SYSTEMS
[Constipation: Grade 0] : Constipation: Grade 0 [Diarrhea: Grade 0] : Diarrhea: Grade 0 [Hematuria: Grade 0] : Hematuria: Grade 0 [Urinary Tract Pain: Grade 0] : Urinary Tract Pain: Grade 0 [Urinary Urgency: Grade 0] : Urinary Urgency: Grade 0 [Urinary Frequency: Grade 0] : Urinary Frequency: Grade 0

## 2021-04-07 PROCEDURE — 77387B: CUSTOM | Mod: 26

## 2021-04-08 PROCEDURE — 77387B: CUSTOM | Mod: 26

## 2021-04-09 PROCEDURE — 77387B: CUSTOM | Mod: 26

## 2021-04-12 PROCEDURE — 77427 RADIATION TX MANAGEMENT X5: CPT

## 2021-04-12 PROCEDURE — 77387B: CUSTOM | Mod: 26

## 2021-04-13 ENCOUNTER — NON-APPOINTMENT (OUTPATIENT)
Age: 71
End: 2021-04-13

## 2021-04-13 PROCEDURE — 77387B: CUSTOM | Mod: 26

## 2021-04-13 NOTE — REVIEW OF SYSTEMS
[Constipation: Grade 0] : Constipation: Grade 0 [Diarrhea: Grade 0] : Diarrhea: Grade 0 [Fatigue: Grade 0] : Fatigue: Grade 0 [Hematuria: Grade 0] : Hematuria: Grade 0 [Urinary Tract Pain: Grade 0] : Urinary Tract Pain: Grade 0 [Dermatitis Radiation: Grade 0] : Dermatitis Radiation: Grade 0

## 2021-04-14 PROCEDURE — 77387B: CUSTOM | Mod: 26

## 2021-04-15 PROCEDURE — 77387B: CUSTOM | Mod: 26

## 2021-04-16 PROCEDURE — 77387B: CUSTOM | Mod: 26

## 2021-04-19 PROCEDURE — 77387B: CUSTOM | Mod: 26

## 2021-04-19 PROCEDURE — 77427 RADIATION TX MANAGEMENT X5: CPT

## 2021-04-20 ENCOUNTER — NON-APPOINTMENT (OUTPATIENT)
Age: 71
End: 2021-04-20

## 2021-04-20 PROCEDURE — 77387B: CUSTOM | Mod: 26

## 2021-04-20 NOTE — HISTORY OF PRESENT ILLNESS
[FreeTextEntry1] : Mr Barry is a 71 year old male with rising PSA 7.99 NG/ML on 9/9/20, [id number] Prostate adenocarcinoma  Cesia 3 + 4=7 with extracapsular extension bilaterally.\par \par 4/20/21 12/26 fx:  Stable daytime urinary frequency.  BMs normal. \par \par 4/13/21 8/26 fx- more frequent soft BMs. No diarrhea\par \par 4/6/21 2/26 fx He is doing well. Denies symptoms.

## 2021-04-20 NOTE — REVIEW OF SYSTEMS
[Diarrhea: Grade 0] : Diarrhea: Grade 0 [Fatigue: Grade 0] : Fatigue: Grade 0 [Hematuria: Grade 0] : Hematuria: Grade 0 [Urinary Tract Pain: Grade 0] : Urinary Tract Pain: Grade 0 [Dermatitis Radiation: Grade 0] : Dermatitis Radiation: Grade 0

## 2021-04-21 PROCEDURE — 77387B: CUSTOM | Mod: 26

## 2021-04-22 PROCEDURE — 77387B: CUSTOM | Mod: 26

## 2021-04-23 PROCEDURE — 77387B: CUSTOM | Mod: 26

## 2021-04-26 PROCEDURE — 77427 RADIATION TX MANAGEMENT X5: CPT

## 2021-04-26 PROCEDURE — 77387B: CUSTOM | Mod: 26

## 2021-04-27 ENCOUNTER — NON-APPOINTMENT (OUTPATIENT)
Age: 71
End: 2021-04-27

## 2021-04-27 PROCEDURE — 77387B: CUSTOM | Mod: 26

## 2021-04-27 NOTE — HISTORY OF PRESENT ILLNESS
[FreeTextEntry1] : Mr Barry is a 71 year old male with rising PSA 7.99 NG/ML on 9/9/20, [id number] Prostate adenocarcinoma  Cesia 3 + 4=7 with extracapsular extension bilaterally.\par \par \par 4/27/21 17/26 fx:  No urinary symptoms, no bowel issues, mild fatigue\par \par 4/20/21 12/26 fx:  Stable daytime urinary frequency.  BMs normal. \par \par 4/13/21 8/26 fx- more frequent soft BMs. No diarrhea\par \par 4/6/21 2/26 fx He is doing well. Denies symptoms.

## 2021-04-27 NOTE — REVIEW OF SYSTEMS
[Diarrhea: Grade 0] : Diarrhea: Grade 0 [Fatigue: Grade 0] : Fatigue: Grade 0 [Hematuria: Grade 0] : Hematuria: Grade 0 [Urinary Tract Pain: Grade 0] : Urinary Tract Pain: Grade 0 [Dermatitis Radiation: Grade 0] : Dermatitis Radiation: Grade 0 [Constipation: Grade 0] : Constipation: Grade 0 [Urinary Frequency: Grade 1 - Present] : Urinary Frequency: Grade 1 - Present

## 2021-04-28 PROCEDURE — 77387B: CUSTOM | Mod: 26

## 2021-04-29 PROCEDURE — 77387B: CUSTOM | Mod: 26

## 2021-04-30 PROCEDURE — 77387B: CUSTOM | Mod: 26

## 2021-05-03 PROCEDURE — 77427 RADIATION TX MANAGEMENT X5: CPT

## 2021-05-03 PROCEDURE — 77387B: CUSTOM | Mod: 26

## 2021-05-04 ENCOUNTER — NON-APPOINTMENT (OUTPATIENT)
Age: 71
End: 2021-05-04

## 2021-05-04 PROCEDURE — 77387B: CUSTOM | Mod: 26

## 2021-05-04 NOTE — REVIEW OF SYSTEMS
[Hematuria: Grade 0] : Hematuria: Grade 0 [Urinary Tract Pain: Grade 0] : Urinary Tract Pain: Grade 0 [Urinary Frequency: Grade 1 - Present] : Urinary Frequency: Grade 1 - Present [Dermatitis Radiation: Grade 0] : Dermatitis Radiation: Grade 0 [Diarrhea: Grade 1 - Increase of <4 stools per day over baseline; mild increase in ostomy output compared to baseline] : Diarrhea: Grade 1 - Increase of <4 stools per day over baseline; mild increase in ostomy output compared to baseline [Fatigue: Grade 1 - Fatigue relieved by rest] : Fatigue: Grade 1 - Fatigue relieved by rest [Urinary Urgency: Grade 1 - Present] : Urinary Urgency: Grade 1 - Present

## 2021-05-05 PROCEDURE — 77387B: CUSTOM | Mod: 26

## 2021-05-06 PROCEDURE — 77387B: CUSTOM | Mod: 26

## 2021-05-07 ENCOUNTER — NON-APPOINTMENT (OUTPATIENT)
Age: 71
End: 2021-05-07

## 2021-05-07 PROCEDURE — 77387B: CUSTOM | Mod: 26

## 2021-05-07 NOTE — REVIEW OF SYSTEMS
[Diarrhea: Grade 1 - Increase of <4 stools per day over baseline; mild increase in ostomy output compared to baseline] : Diarrhea: Grade 1 - Increase of <4 stools per day over baseline; mild increase in ostomy output compared to baseline [Fatigue: Grade 1 - Fatigue relieved by rest] : Fatigue: Grade 1 - Fatigue relieved by rest [Dermatitis Radiation: Grade 0] : Dermatitis Radiation: Grade 0 [Proctitis: Grade 1 - Rectal discomfort, intervention not indicated] : Proctitis: Grade 1 - Rectal discomfort, intervention not indicated [Urinary Frequency: Grade 1 - Present] : Urinary Frequency: Grade 1 - Present

## 2021-05-07 NOTE — DISEASE MANAGEMENT
[Ultrasound] : TNM Stage: u [IIIA] : IIIA [TTNM] : 3a [NTNM] : 0 [MTNM] : 0 [de-identified] : Prostate

## 2021-05-07 NOTE — DISEASE MANAGEMENT
[Ultrasound] : TNM Stage: u [IIIA] : IIIA [TTNM] : 3a [NTNM] : 0 [MTNM] : 0 [de-identified] : Prostate

## 2021-05-07 NOTE — HISTORY OF PRESENT ILLNESS
[FreeTextEntry1] : Mr Barry is a 71 year old male with rising PSA 7.99 NG/ML on 9/9/20, [id number] Prostate adenocarcinoma  [place] 3 + 4=7 with extracapsular extension bilaterally.\par \par 5/4/21 22/26 fx Worsening  fatigue, stable urine frequency and urgency, diarrhea over the weekend and yesterday, none today,  Immodium taken with relief.\par \par 4/27/21 17/26 fx:  No urinary symptoms, no bowel issues, mild fatigue\par \par 4/20/21 12/26 fx:  Stable daytime urinary frequency.  BMs normal. \par \par 4/13/21 8/26 fx- more frequent soft BMs. No diarrhea\par \par 4/6/21 2/26 fx He is doing well. Denies symptoms.

## 2021-05-07 NOTE — HISTORY OF PRESENT ILLNESS
[FreeTextEntry1] : Mr Barry is a 71 year old male with rising PSA 7.99 NG/ML on 9/9/20, [id number] Prostate adenocarcinoma  Cesia 3 + 4=7 with extracapsular extension bilaterally.\par \par \par 5/7/21 25/26 fx- urinary frequency very hour past 2 days but resolved today, reduced urine volume. Occasional diarrhoea improved by Immodium\par \par 5/4/21 22/26 fx Worsening  fatigue, stable urine frequency and urgency, diarrhea over the weekend and yesterday, none today,  Immodium taken with relief.\par \par 4/27/21 17/26 fx:  No urinary symptoms, no bowel issues, mild fatigue\par \par 4/20/21 12/26 fx:  Stable daytime urinary frequency.  BMs normal. \par \par 4/13/21 8/26 fx- more frequent soft BMs. No diarrhea\par \par 4/6/21 2/26 fx He is doing well. Denies symptoms.

## 2021-05-10 PROCEDURE — 77387B: CUSTOM | Mod: 26

## 2021-05-11 LAB
APPEARANCE: CLEAR
BILIRUBIN URINE: NEGATIVE
BLOOD URINE: NEGATIVE
COLOR: YELLOW
GLUCOSE QUALITATIVE U: NEGATIVE
KETONES URINE: NEGATIVE
LEUKOCYTE ESTERASE URINE: NEGATIVE
NITRITE URINE: NEGATIVE
PH URINE: 5.5
PROTEIN URINE: NEGATIVE
SPECIFIC GRAVITY URINE: 1.02
UROBILINOGEN URINE: NORMAL

## 2021-05-14 NOTE — HISTORY OF PRESENT ILLNESS
[FreeTextEntry1] : Mr Barry is a 71 year old male with rising PSA 7.99 NG/ML on 9/9/20, [id number] Prostate adenocarcinoma  Cesia 3 + 4=7 with extracapsular extension bilaterally.\par \par 4/13/21 8/26 fx- more frequent soft BMs. No diarrhea\par \par 4/6/21 2/26 fx He is doing well. Denies symptoms.

## 2021-05-19 ENCOUNTER — NON-APPOINTMENT (OUTPATIENT)
Age: 71
End: 2021-05-19

## 2021-06-08 ENCOUNTER — APPOINTMENT (OUTPATIENT)
Dept: RADIATION ONCOLOGY | Facility: CLINIC | Age: 71
End: 2021-06-08
Payer: MEDICARE

## 2021-06-08 VITALS
WEIGHT: 166.45 LBS | TEMPERATURE: 97.1 F | RESPIRATION RATE: 17 BRPM | HEART RATE: 67 BPM | DIASTOLIC BLOOD PRESSURE: 63 MMHG | BODY MASS INDEX: 25.31 KG/M2 | OXYGEN SATURATION: 97 % | SYSTOLIC BLOOD PRESSURE: 110 MMHG

## 2021-06-08 LAB — PSA SERPL-MCNC: <0.01 NG/ML

## 2021-06-08 PROCEDURE — 99072 ADDL SUPL MATRL&STAF TM PHE: CPT

## 2021-06-08 PROCEDURE — 99214 OFFICE O/P EST MOD 30 MIN: CPT

## 2021-06-08 RX ORDER — TAMSULOSIN HYDROCHLORIDE 0.4 MG/1
0.4 CAPSULE ORAL
Qty: 28 | Refills: 3 | Status: DISCONTINUED | COMMUNITY
Start: 2021-05-07 | End: 2021-06-08

## 2021-06-08 NOTE — HISTORY OF PRESENT ILLNESS
[FreeTextEntry1] : Mr Barry is a 71 year old male with rising PSA 7.99 NG/ML on 9/9/20, [id number] Prostate adenocarcinoma  Cesia 3+4=7 with extracapsular extension bilaterally.\par \par \par Radiation\par Treatment Dates: 4/5/2021 - 5/10/2021\par Prostate/SV/Nodes  IMRT  7,020 cGy  26  270 cGy  .Per Plan \par CLINICAL COURSE:  Tolerated his radiation well without significant acute side effects.  \par \par Plan for 6-24m ADT in view of extracapsular extension\par \par PSA 6/7/21:< 0.01 ng/ML\par \par Today: Feels well. Denies urinary frequency, hematuria, diarrhea, constipation or blood in stool.  Sexual function - has ED. IPSS/EPIC Score 6/1.  6 mo Eligard due September and will see Dr. Shi\par \par \par

## 2021-06-08 NOTE — REVIEW OF SYSTEMS
[Diarrhea: Grade 0] : Diarrhea: Grade 0 [Dyspepsia: Grade 0] : Dyspepsia: Grade 0 [Fatigue: Grade 1 - Fatigue relieved by rest] : Fatigue: Grade 1 - Fatigue relieved by rest [Hematuria: Grade 0] : Hematuria: Grade 0 [Urinary Tract Pain: Grade 0] : Urinary Tract Pain: Grade 0 [Urinary Urgency: Grade 0] : Urinary Urgency: Grade 0 [Urinary Frequency: Grade 0] : Urinary Frequency: Grade 0

## 2021-08-22 ENCOUNTER — NON-APPOINTMENT (OUTPATIENT)
Age: 71
End: 2021-08-22

## 2021-09-08 ENCOUNTER — APPOINTMENT (OUTPATIENT)
Dept: RADIATION ONCOLOGY | Facility: CLINIC | Age: 71
End: 2021-09-08
Payer: MEDICARE

## 2021-09-08 ENCOUNTER — APPOINTMENT (OUTPATIENT)
Dept: CARDIOLOGY | Facility: CLINIC | Age: 71
End: 2021-09-08
Payer: MEDICARE

## 2021-09-08 ENCOUNTER — NON-APPOINTMENT (OUTPATIENT)
Age: 71
End: 2021-09-08

## 2021-09-08 VITALS — BODY MASS INDEX: 25.09 KG/M2 | WEIGHT: 165 LBS

## 2021-09-08 VITALS
DIASTOLIC BLOOD PRESSURE: 73 MMHG | HEIGHT: 68 IN | SYSTOLIC BLOOD PRESSURE: 113 MMHG | BODY MASS INDEX: 25.09 KG/M2 | OXYGEN SATURATION: 96 % | HEART RATE: 65 BPM

## 2021-09-08 VITALS
WEIGHT: 162.81 LBS | OXYGEN SATURATION: 96 % | RESPIRATION RATE: 17 BRPM | DIASTOLIC BLOOD PRESSURE: 68 MMHG | HEART RATE: 63 BPM | SYSTOLIC BLOOD PRESSURE: 103 MMHG | BODY MASS INDEX: 24.76 KG/M2 | TEMPERATURE: 95.72 F

## 2021-09-08 PROCEDURE — 99212 OFFICE O/P EST SF 10 MIN: CPT

## 2021-09-08 PROCEDURE — 99214 OFFICE O/P EST MOD 30 MIN: CPT

## 2021-09-08 PROCEDURE — 93000 ELECTROCARDIOGRAM COMPLETE: CPT

## 2021-09-08 NOTE — REVIEW OF SYSTEMS
[Diarrhea: Grade 0] : Diarrhea: Grade 0 [Dyspepsia: Grade 0] : Dyspepsia: Grade 0 [Hematuria: Grade 0] : Hematuria: Grade 0 [Urinary Tract Pain: Grade 0] : Urinary Tract Pain: Grade 0 [Urinary Urgency: Grade 0] : Urinary Urgency: Grade 0 [Urinary Frequency: Grade 0] : Urinary Frequency: Grade 0 [Fatigue: Grade 1 - Fatigue relieved by rest] : Fatigue: Grade 1 - Fatigue relieved by rest

## 2021-09-08 NOTE — HISTORY OF PRESENT ILLNESS
[FreeTextEntry1] : Mr Barry is a 71 year old male with rising PSA 7.99 NG/ML on 9/9/20, [id number] Prostate adenocarcinoma  [place] 3+4=7 with extracapsular extension bilaterally.\par \par \par Radiation\par Treatment Dates: 4/5/2021 - 5/10/2021\par Prostate/SV/Nodes  IMRT  7,020 cGy in 26fx of 270 cGy  .Per Plan \par CLINICAL COURSE:  Tolerated his radiation well without significant acute side effects.  \par \par Plan for 6-24m ADT in view of extracapsular extension\par \par PSA \par 6/7/21:< 0.01 ng/ML\par 9/7/21: :< 0.01 ng/ML\par \par Today: \par Feels generally well. Occasional dribbling and  frequency. Denies nocturia , no bowel symptoms. IPSS/EPIC Score 3/2.  6 mo Eligard due September -will see Dr. Shi 9/16/21\par \par \par

## 2021-09-09 LAB
ALBUMIN SERPL ELPH-MCNC: 4 G/DL
ALP BLD-CCNC: 102 U/L
ALT SERPL-CCNC: 18 U/L
ANION GAP SERPL CALC-SCNC: 15 MMOL/L
AST SERPL-CCNC: 28 U/L
BASOPHILS # BLD AUTO: 0.02 K/UL
BASOPHILS NFR BLD AUTO: 0.3 %
BILIRUB DIRECT SERPL-MCNC: 0.2 MG/DL
BILIRUB INDIRECT SERPL-MCNC: 0.4 MG/DL
BILIRUB SERPL-MCNC: 0.6 MG/DL
BUN SERPL-MCNC: 22 MG/DL
CALCIUM SERPL-MCNC: 9.3 MG/DL
CHLORIDE SERPL-SCNC: 106 MMOL/L
CHOLEST SERPL-MCNC: 123 MG/DL
CO2 SERPL-SCNC: 23 MMOL/L
CREAT SERPL-MCNC: 1.08 MG/DL
EOSINOPHIL # BLD AUTO: 0.07 K/UL
EOSINOPHIL NFR BLD AUTO: 1.2 %
ESTIMATED AVERAGE GLUCOSE: 105 MG/DL
GLUCOSE SERPL-MCNC: 91 MG/DL
HBA1C MFR BLD HPLC: 5.3 %
HCT VFR BLD CALC: 38 %
HDLC SERPL-MCNC: 40 MG/DL
HGB BLD-MCNC: 13.1 G/DL
IMM GRANULOCYTES NFR BLD AUTO: 0.2 %
LDLC SERPL CALC-MCNC: 42 MG/DL
LYMPHOCYTES # BLD AUTO: 1.13 K/UL
LYMPHOCYTES NFR BLD AUTO: 19 %
MAN DIFF?: NORMAL
MCHC RBC-ENTMCNC: 33.5 PG
MCHC RBC-ENTMCNC: 34.5 GM/DL
MCV RBC AUTO: 97.2 FL
MONOCYTES # BLD AUTO: 0.44 K/UL
MONOCYTES NFR BLD AUTO: 7.4 %
NEUTROPHILS # BLD AUTO: 4.28 K/UL
NEUTROPHILS NFR BLD AUTO: 71.9 %
NONHDLC SERPL-MCNC: 83 MG/DL
PLATELET # BLD AUTO: 150 K/UL
POTASSIUM SERPL-SCNC: 3.9 MMOL/L
PROT SERPL-MCNC: 6.5 G/DL
RBC # BLD: 3.91 M/UL
RBC # FLD: 12.8 %
SODIUM SERPL-SCNC: 144 MMOL/L
TRIGL SERPL-MCNC: 204 MG/DL
WBC # FLD AUTO: 5.95 K/UL

## 2021-09-09 NOTE — DISCUSSION/SUMMARY
[Cardiomyopathy] : cardiomyopathy [Coronary Artery Disease] : coronary artery disease [Stable] : stable [FreeTextEntry1] : \par Currently stable from a cardiovascular standpoint. Normotensive. History of ischemic cardiomyopathy. Currently euvolemic. Stable CAD (s/p distal RCA stent). Asymptomatic. Continue current medications. ECG completed today and reviewed (findings as noted above). Will check labs today as patient does not see a PCP. Follow up in 6 months.

## 2021-09-09 NOTE — CARDIOLOGY SUMMARY
[de-identified] : \par 09/08/21 - normal sinus rhythm, low voltage\par  [de-identified] : \par 10/29/17 - normal LA, mild segmental LV systolic dysfunction, normal RV size and function, LVEF 45%\par  [de-identified] : \par 10/29/17 (PCI) - RESOLUTE MELA stent to dRCA 99%\par 10/29/17 (DIAG) - pRCA 20%, dRCA 99%, LVEF 50%

## 2021-09-09 NOTE — HISTORY OF PRESENT ILLNESS
[FreeTextEntry1] : Doing well. Completed radiation treatments for prostate cancer. Denies chest pain, shortness of breath or palpitations.

## 2021-09-15 LAB — PSA SERPL-MCNC: <0.01 NG/ML

## 2021-09-16 ENCOUNTER — OUTPATIENT (OUTPATIENT)
Dept: OUTPATIENT SERVICES | Facility: HOSPITAL | Age: 71
LOS: 1 days | End: 2021-09-16
Payer: MEDICARE

## 2021-09-16 ENCOUNTER — APPOINTMENT (OUTPATIENT)
Dept: UROLOGY | Facility: CLINIC | Age: 71
End: 2021-09-16
Payer: MEDICARE

## 2021-09-16 ENCOUNTER — NON-APPOINTMENT (OUTPATIENT)
Age: 71
End: 2021-09-16

## 2021-09-16 DIAGNOSIS — R35.0 FREQUENCY OF MICTURITION: ICD-10-CM

## 2021-09-16 DIAGNOSIS — Z90.49 ACQUIRED ABSENCE OF OTHER SPECIFIED PARTS OF DIGESTIVE TRACT: Chronic | ICD-10-CM

## 2021-09-16 PROCEDURE — 99213 OFFICE O/P EST LOW 20 MIN: CPT | Mod: 25

## 2021-09-16 PROCEDURE — 96402 CHEMO HORMON ANTINEOPL SQ/IM: CPT

## 2021-09-16 RX ORDER — LEUPROLIDE ACETATE 45 MG/.375ML
45 INJECTION, SUSPENSION, EXTENDED RELEASE SUBCUTANEOUS
Qty: 1 | Refills: 0 | Status: COMPLETED | OUTPATIENT
Start: 2021-03-11 | End: 2021-09-16

## 2021-09-16 RX ORDER — LEUPROLIDE ACETATE 45 MG/.375ML
45 INJECTION, SUSPENSION, EXTENDED RELEASE SUBCUTANEOUS
Refills: 0 | Status: COMPLETED | OUTPATIENT
Start: 2021-09-16

## 2021-09-16 RX ADMIN — LEUPROLIDE ACETATE 0 MG: KIT SUBCUTANEOUS at 00:00

## 2021-09-16 NOTE — ADDENDUM
[FreeTextEntry1] : Entered by Felipe Vargas, acting as scribe for Dr. Silvio Shi.\par \par The documentation recorded by the scribe accurately reflects the service I personally performed and the decisions made by me.

## 2021-09-16 NOTE — LETTER BODY
[FreeTextEntry1] : Raman Palomares MD\par [address]\[place] [address]\par [phone]\par \par Dear Dr. Palomares,\par \par REASON FOR VISIT: Prostate cancer. \par  \par This is a 71 year-old gentleman with prostate cancer. The patient returns today for Eligard injection. He denies any interval complaints or difficulties. Patient reports no pain. He has been doing well. Patient denies any changes in health. The past medical history and family history and social history are unchanged. All other review of systems are negative. Patient denies any changes in medications. Medication list was reconciled. \par  \par He is currently taking calcium supplements and vitamin D for osteoporosis. \par \par On examination, the patient is a healthy-appearing gentleman in no acute distress. He is alert and oriented follows commands. He has normal mood and affect. He is normocephalic. Oral no thrush. Neck is supple. Respirations are unlabored. His abdomen is soft and nontender. Liver is nonpalpable. Bladder is nonpalpable. No CVA tenderness. Neurologically he is grossly intact. No peripheral edema. Skin without gross abnormality.\par \par His PSA is >0.01, which is undetectable. \par \par The patient's left lower abdomen was cleaned with alcohol, 45 mg Eligard was applied subcutaneously. Patient tolerated procedure well.\par \par Assessment: Prostate cancer on the androgen ablation. \par  \par I counseled the patient. I encourage patient to continue with Eligard as LHRH agonist will help suppress his prostate cancer. He will obtain PSA and testosterone to monitor efficacy of treatment. Risks and alternatives were discussed. I answered the patient questions. The patient will follow-up as directed and will contact me with any questions or concerns. He will return in six months time for Eligard injection. Thank you for the opportunity to participate in the care of this patient. I'll keep you updated on his progress.\par  \par Plan: PSA. Testosterone. Eligard in 6 months.\par \par I spent 20-minutes time today on all issues related to this encounter on today date of service including non face to face time.

## 2021-09-16 NOTE — HISTORY OF PRESENT ILLNESS
[FreeTextEntry1] : Please refer to URO Consult note \par \par  fu prostate caner \par PSA undetectable\par eligard left side  \par come back in 1 yr \par

## 2021-09-20 DIAGNOSIS — C61 MALIGNANT NEOPLASM OF PROSTATE: ICD-10-CM

## 2021-09-20 DIAGNOSIS — Z00.00 ENCOUNTER FOR GENERAL ADULT MEDICAL EXAMINATION WITHOUT ABNORMAL FINDINGS: ICD-10-CM

## 2021-11-29 ENCOUNTER — EMERGENCY (EMERGENCY)
Facility: HOSPITAL | Age: 71
LOS: 1 days | Discharge: HOME | End: 2021-11-29
Attending: STUDENT IN AN ORGANIZED HEALTH CARE EDUCATION/TRAINING PROGRAM | Admitting: STUDENT IN AN ORGANIZED HEALTH CARE EDUCATION/TRAINING PROGRAM
Payer: MEDICARE

## 2021-11-29 VITALS
OXYGEN SATURATION: 98 % | TEMPERATURE: 98 F | HEART RATE: 74 BPM | RESPIRATION RATE: 16 BRPM | HEIGHT: 68 IN | DIASTOLIC BLOOD PRESSURE: 88 MMHG | SYSTOLIC BLOOD PRESSURE: 160 MMHG

## 2021-11-29 VITALS
OXYGEN SATURATION: 99 % | HEART RATE: 66 BPM | RESPIRATION RATE: 16 BRPM | SYSTOLIC BLOOD PRESSURE: 167 MMHG | DIASTOLIC BLOOD PRESSURE: 78 MMHG

## 2021-11-29 DIAGNOSIS — Z90.49 ACQUIRED ABSENCE OF OTHER SPECIFIED PARTS OF DIGESTIVE TRACT: Chronic | ICD-10-CM

## 2021-11-29 LAB
ALBUMIN SERPL ELPH-MCNC: 3.9 G/DL — SIGNIFICANT CHANGE UP (ref 3.3–5)
ALP SERPL-CCNC: 117 U/L — SIGNIFICANT CHANGE UP (ref 40–120)
ALT FLD-CCNC: 18 U/L — SIGNIFICANT CHANGE UP (ref 4–41)
ANION GAP SERPL CALC-SCNC: 10 MMOL/L — SIGNIFICANT CHANGE UP (ref 7–14)
APPEARANCE UR: CLEAR — SIGNIFICANT CHANGE UP
AST SERPL-CCNC: 25 U/L — SIGNIFICANT CHANGE UP (ref 4–40)
BACTERIA # UR AUTO: NEGATIVE — SIGNIFICANT CHANGE UP
BASOPHILS # BLD AUTO: 0.02 K/UL — SIGNIFICANT CHANGE UP (ref 0–0.2)
BASOPHILS NFR BLD AUTO: 0.2 % — SIGNIFICANT CHANGE UP (ref 0–2)
BILIRUB SERPL-MCNC: 0.5 MG/DL — SIGNIFICANT CHANGE UP (ref 0.2–1.2)
BILIRUB UR-MCNC: NEGATIVE — SIGNIFICANT CHANGE UP
BUN SERPL-MCNC: 17 MG/DL — SIGNIFICANT CHANGE UP (ref 7–23)
CALCIUM SERPL-MCNC: 9.5 MG/DL — SIGNIFICANT CHANGE UP (ref 8.4–10.5)
CHLORIDE SERPL-SCNC: 106 MMOL/L — SIGNIFICANT CHANGE UP (ref 98–107)
CO2 SERPL-SCNC: 25 MMOL/L — SIGNIFICANT CHANGE UP (ref 22–31)
COLOR SPEC: SIGNIFICANT CHANGE UP
CREAT SERPL-MCNC: 0.96 MG/DL — SIGNIFICANT CHANGE UP (ref 0.5–1.3)
DIFF PNL FLD: ABNORMAL
EOSINOPHIL # BLD AUTO: 0.13 K/UL — SIGNIFICANT CHANGE UP (ref 0–0.5)
EOSINOPHIL NFR BLD AUTO: 1.6 % — SIGNIFICANT CHANGE UP (ref 0–6)
EPI CELLS # UR: 0 /HPF — SIGNIFICANT CHANGE UP (ref 0–5)
GLUCOSE SERPL-MCNC: 111 MG/DL — HIGH (ref 70–99)
GLUCOSE UR QL: NEGATIVE — SIGNIFICANT CHANGE UP
HCT VFR BLD CALC: 42.8 % — SIGNIFICANT CHANGE UP (ref 39–50)
HGB BLD-MCNC: 14.3 G/DL — SIGNIFICANT CHANGE UP (ref 13–17)
IANC: 6.18 K/UL — SIGNIFICANT CHANGE UP (ref 1.5–8.5)
IMM GRANULOCYTES NFR BLD AUTO: 0.1 % — SIGNIFICANT CHANGE UP (ref 0–1.5)
KETONES UR-MCNC: NEGATIVE — SIGNIFICANT CHANGE UP
LEUKOCYTE ESTERASE UR-ACNC: NEGATIVE — SIGNIFICANT CHANGE UP
LIDOCAIN IGE QN: 14 U/L — SIGNIFICANT CHANGE UP (ref 7–60)
LYMPHOCYTES # BLD AUTO: 1.29 K/UL — SIGNIFICANT CHANGE UP (ref 1–3.3)
LYMPHOCYTES # BLD AUTO: 15.4 % — SIGNIFICANT CHANGE UP (ref 13–44)
MCHC RBC-ENTMCNC: 33.2 PG — SIGNIFICANT CHANGE UP (ref 27–34)
MCHC RBC-ENTMCNC: 33.4 GM/DL — SIGNIFICANT CHANGE UP (ref 32–36)
MCV RBC AUTO: 99.3 FL — SIGNIFICANT CHANGE UP (ref 80–100)
MONOCYTES # BLD AUTO: 0.75 K/UL — SIGNIFICANT CHANGE UP (ref 0–0.9)
MONOCYTES NFR BLD AUTO: 8.9 % — SIGNIFICANT CHANGE UP (ref 2–14)
NEUTROPHILS # BLD AUTO: 6.18 K/UL — SIGNIFICANT CHANGE UP (ref 1.8–7.4)
NEUTROPHILS NFR BLD AUTO: 73.8 % — SIGNIFICANT CHANGE UP (ref 43–77)
NITRITE UR-MCNC: NEGATIVE — SIGNIFICANT CHANGE UP
NRBC # BLD: 0 /100 WBCS — SIGNIFICANT CHANGE UP
NRBC # FLD: 0 K/UL — SIGNIFICANT CHANGE UP
PH UR: 7 — SIGNIFICANT CHANGE UP (ref 5–8)
PLATELET # BLD AUTO: 166 K/UL — SIGNIFICANT CHANGE UP (ref 150–400)
POTASSIUM SERPL-MCNC: 4.3 MMOL/L — SIGNIFICANT CHANGE UP (ref 3.5–5.3)
POTASSIUM SERPL-SCNC: 4.3 MMOL/L — SIGNIFICANT CHANGE UP (ref 3.5–5.3)
PROT SERPL-MCNC: 6.7 G/DL — SIGNIFICANT CHANGE UP (ref 6–8.3)
PROT UR-MCNC: NEGATIVE — SIGNIFICANT CHANGE UP
RBC # BLD: 4.31 M/UL — SIGNIFICANT CHANGE UP (ref 4.2–5.8)
RBC # FLD: 12.6 % — SIGNIFICANT CHANGE UP (ref 10.3–14.5)
RBC CASTS # UR COMP ASSIST: 6 /HPF — HIGH (ref 0–4)
SODIUM SERPL-SCNC: 141 MMOL/L — SIGNIFICANT CHANGE UP (ref 135–145)
SP GR SPEC: 1.01 — SIGNIFICANT CHANGE UP (ref 1–1.05)
UROBILINOGEN FLD QL: SIGNIFICANT CHANGE UP
WBC # BLD: 8.38 K/UL — SIGNIFICANT CHANGE UP (ref 3.8–10.5)
WBC # FLD AUTO: 8.38 K/UL — SIGNIFICANT CHANGE UP (ref 3.8–10.5)
WBC UR QL: 0 /HPF — SIGNIFICANT CHANGE UP (ref 0–5)

## 2021-11-29 PROCEDURE — 99284 EMERGENCY DEPT VISIT MOD MDM: CPT | Mod: GC

## 2021-11-29 PROCEDURE — 76775 US EXAM ABDO BACK WALL LIM: CPT | Mod: 26

## 2021-11-29 RX ORDER — SODIUM CHLORIDE 9 MG/ML
1000 INJECTION INTRAMUSCULAR; INTRAVENOUS; SUBCUTANEOUS ONCE
Refills: 0 | Status: COMPLETED | OUTPATIENT
Start: 2021-11-29 | End: 2021-11-29

## 2021-11-29 RX ADMIN — SODIUM CHLORIDE 1000 MILLILITER(S): 9 INJECTION INTRAMUSCULAR; INTRAVENOUS; SUBCUTANEOUS at 07:54

## 2021-11-29 RX ADMIN — SODIUM CHLORIDE 1000 MILLILITER(S): 9 INJECTION INTRAMUSCULAR; INTRAVENOUS; SUBCUTANEOUS at 05:06

## 2021-11-29 NOTE — ED PROVIDER NOTE - OBJECTIVE STATEMENT
70 y/o male with pmhx of HTN, hypercholesterolemia, prostate cancer, appendectomy and kidney stones presenting with right flank pain. Started 1 week ago and came back tonight, radiates to abdomen with no associated fevers/chills, n/v/d, dysuria/hematuria, rashes. States that it feels like his kidney stones in the past. Did not take anything for pain. No pain on presentation

## 2021-11-29 NOTE — ED PROVIDER NOTE - CARE PROVIDER_API CALL
Raven Shea)  Urology  [address] [place] [address]  Phone: [phone]  Fax: [phone]  Follow Up Time:

## 2021-11-29 NOTE — ED PROVIDER NOTE - CLINICAL SUMMARY MEDICAL DECISION MAKING FREE TEXT BOX
72 y/o male with pmhx of HTN, hypercholesterolemia, prostate cancer, appendectomy and kidney stones presenting with right flank pain, afebrile, well appearing, no urinary or infectious symptoms, no pain on presentation. Will check labs, UA/UC, NS bolus, and U/s for possible hydronephrosis for suspicion of kidney stone vs. pyelonephritis with possible f/u CT scan pending labs and u/s

## 2021-11-29 NOTE — ED PROVIDER NOTE - NSFOLLOWUPINSTRUCTIONS_ED_ALL_ED_FT
You symptoms are consistent with a kidney stone. To control the pain, you should take Ibuprofen 400 mg along with Tylenol 650mg every 6 hours. These are both over the counter medications that you can  at your local pharmacy without a prescription. If you are still having severe pain in 48 hours you should return to the emergency room or a urologist if able. If you began having fever, uncontrollable nausea and vomiting then you also need to come back to the ED. You symptoms are consistent with a kidney stone. Please follow up with your urologist as we discussed. To control the pain, you should take Ibuprofen 400 mg along with Tylenol 650mg every 6 hours. These are both over the counter medications that you can  at your local pharmacy without a prescription. If you are still having severe pain in 48 hours you should return to the emergency room or a urologist if able. If you began having fever, uncontrollable nausea and vomiting then you also need to come back to the ED. Renal Colic    WHAT YOU NEED TO KNOW:    Renal colic is severe pain in your lower back or sides. The pain is usually on one side, but may be on both sides of your lower back. Renal colic may start quickly, come and go, and become worse over time. Renal colic is caused by a blockage in your urinary tract. The most common cause of a blockage is a kidney stone. Blood clots, ureter spasms, and dead tissue may also block your urinary tract.    DISCHARGE INSTRUCTIONS:    Return to the emergency department if:   •You cannot stop vomiting.    •You see new or increased bleeding when you urinate.    •You are urinating less than usual, or not at all.    •Your pain is not getting better even after treatment.    Call your doctor if:   •You have fever.    •You need to urinate more often than usual, or right away.    •You see a stone in your urine strainer after you urinate.    •You have questions or concerns about your condition or care.    Medicines:   •Medicines may help decrease pain and muscle spasms. You may also need medicine to calm your stomach and stop vomiting. To control the pain, you should take Ibuprofen 400 mg along with Tylenol 650mg every 6 hours.    •Take your medicine as directed. Contact your healthcare provider if you think your medicine is not helping or if you have side effects. Tell him of her if you are allergic to any medicine. Keep a list of the medicines, vitamins, and herbs you take. Include the amounts, and when and why you take them. Bring the list or the pill bottles to follow-up visits. Carry your medicine list with you in case of an emergency.    Manage your symptoms:   •Drink liquids as directed. This will help decrease pain and flush blockages from your urinary tract. Ask how much liquid to drink each day and which liquids are best for you. You may need to drink about 3 liters (12 glasses) of liquids each day. Half of your total daily liquids should be water. Limit coffee, tea, and soda to 2 cups daily. Your urine should be pale and clear.    •Strain your urine every time you urinate. Urinate into a strainer (funnel with a fine mesh on the bottom) or glass jar to collect kidney stones. Give the kidney stones to your healthcare provider at your next visit.  Look for Stones in the Filter     •Eat a variety of healthy foods. Healthy foods include fruits, vegetables, whole-grain breads, low-fat dairy products, beans, lean meats, and fish. You may need to increase the amount of citrus fruit you eat, such as oranges. Ask your healthcare provider how much salt, calcium, and protein you should eat.  Healthy Foods     •Avoid activity in hot temperatures. Heat may cause you to become dehydrated and urinate less.    Follow up with your doctor as directed: You may need to return for tests to check if your blockage has cleared. Write down your questions so you remember to ask them during your visits.

## 2021-11-29 NOTE — ED ADULT NURSE NOTE - OBJECTIVE STATEMENT
Pt. A&Ox4, ambulatory. Pt. c/o right flank pain similar to that of kidney stones 15 years ago which he passed on his own. Pt. now states pain has resolved from before states it is 3/10. Pt.'s R flank tender to palpation. RAC20 labs sent bolus running.

## 2021-11-29 NOTE — ED PROVIDER NOTE - PROGRESS NOTE DETAILS
Oneil MYERS: Patient in sign out. 68 Yr old male with h/o Ureteric colic 15 yrs ago presenting with right sided flank pain now resolved. VS wnl. PE Right renal punch -ve, o/w wnl. UA reveal hematuria, Will give 1L N/S. Patient currently pain-free therefore not opting for analgesia at the moment.

## 2021-11-29 NOTE — ED PROVIDER NOTE - ATTENDING CONTRIBUTION TO CARE
70 yo M past medical history htn hld, prostate ca s/p radiation, kidney stones, psh appendectomy, presents for r flank pain radiating to the right groin. had episode last week that resolved and then recurred tonight prior to arrival. at time of eval "discomfort" was minimal. denies dysuria/hematuria. denies n/v, abd pain, fever chills, new back pain, cp, sob. denies ext pain/numbness/weakness. has not taking any pain meds. denies tobacco, etoh, drug use. exam as above. Ddx includes, but not limited to, renal stone, uti. plan: labs, us, symptom relief prn (currently declining need for any pain meds) , reassess.

## 2021-11-29 NOTE — ED PROVIDER NOTE - PHYSICAL EXAMINATION
Gen: WDWN, NAD, comfortable appearing   HEENT: PERRLA, EOMI, no nasal discharge, mucous membranes moist, no oropharyngeal edema/erythema/exudates   CV: RRR, +S1/S2, no M/R/G, equal b/l radial pulses 2+  Resp: CTAB, no W/R/R, SPO2 100% on RA, no increased WOB   GI: Abdomen soft non-distended, NTTP, no masses/organomegaly   : No testicular swelling/tenderness b/l   MSK/Skin: Right sided CVA tenderness, no open wounds, no bruising, no LE edema  Neuro: CN2-12 grossly intact, A&Ox4, MS +5/5 in UE and LE BL, gross sensation intact in UE and LE BL  Psych: appropriate mood

## 2021-11-29 NOTE — ED PROVIDER NOTE - NSICDXFAMILYHX_GEN_ALL_CORE_FT
FAMILY HISTORY:  Grandparent  Still living? Unknown  Family history of coronary artery disease in grandfather, Age at diagnosis: Age Unknown  Family history of coronary artery disease in grandmother, Age at diagnosis: Age Unknown

## 2021-11-29 NOTE — ED PROVIDER NOTE - NSFOLLOWUPCLINICS_GEN_ALL_ED_FT
[facility] - Urology  Urology  [address] [facility], 3rd & [address]th [place] [address]  Phone: [phone]  Fax:     [facility]  Urology  [address] [place] [address] - [address]rd [place] [address]  Phone: [phone]  Fax:

## 2021-11-29 NOTE — ED ADULT NURSE NOTE - NSIMPLEMENTINTERV_GEN_ALL_ED
Implemented All Universal Safety Interventions:  [facility] to call system. Call bell, personal items and telephone within reach. Instruct patient to call for assistance. Room bathroom lighting operational. Non-slip footwear when patient is off stretcher. Physically safe environment: no spills, clutter or unnecessary equipment. Stretcher in lowest position, wheels locked, appropriate side rails in place.

## 2021-11-30 LAB
CULTURE RESULTS: SIGNIFICANT CHANGE UP
SPECIMEN SOURCE: SIGNIFICANT CHANGE UP

## 2022-01-07 NOTE — REASON FOR VISIT
[Prostate Cancer] : prostate cancer [Routine Follow-Up] : a routine follow-up visit for prostate cancer

## 2022-01-11 LAB — PSA SERPL-MCNC: <0.01 NG/ML

## 2022-01-12 ENCOUNTER — APPOINTMENT (OUTPATIENT)
Dept: RADIATION ONCOLOGY | Facility: CLINIC | Age: 72
End: 2022-01-12
Payer: MEDICARE

## 2022-01-12 VITALS
OXYGEN SATURATION: 100 % | HEART RATE: 60 BPM | DIASTOLIC BLOOD PRESSURE: 78 MMHG | BODY MASS INDEX: 25.39 KG/M2 | WEIGHT: 167 LBS | RESPIRATION RATE: 17 BRPM | SYSTOLIC BLOOD PRESSURE: 138 MMHG

## 2022-01-12 PROCEDURE — 99212 OFFICE O/P EST SF 10 MIN: CPT

## 2022-01-12 NOTE — HISTORY OF PRESENT ILLNESS
[FreeTextEntry1] : Mr Barry is a 71 year old male with rising PSA 7.99 NG/ML on 9/9/20, [id number] Prostate adenocarcinoma  [place] 3+4=7 with extracapsular extension bilaterally.\par \par \par Radiation\par Treatment Dates: 4/5/2021 - 5/10/2021\par Prostate/SV/Nodes  IMRT  7,020 cGy in 26fx of 270 cGy  .Per Plan \par CLINICAL COURSE:  Tolerated his radiation well without significant acute side effects.  \par \par Plan for 12m ADT in view of extracapsular extension - last implant Sept 2021.\par \par PSA \par 6/7/21:< 0.01 ng/ML\par 9/7/21: :< 0.01 ng/ML\par 1/4/22: <0.01 ng/mL\par \par 9/8/21 Feels generally well. Occasional dribbling and  frequency. Denies nocturia , no bowel symptoms. IPSS/EPIC Score 3/2.  6 mo Eligard due September -will see Dr. Shi 9/16/21\par \par 1/12/22 Presents for follow-up. Patient doing well, no bladder or bowel issues noted.  EPIC score 0 and IPSS 2.  PSA on 1/4/22  <0.01. \par \par \par

## 2022-01-12 NOTE — REVIEW OF SYSTEMS
[Hematuria: Grade 0] : Hematuria: Grade 0 [Urinary Urgency: Grade 0] : Urinary Urgency: Grade 0 [Urinary Frequency: Grade 0] : Urinary Frequency: Grade 0 [Fatigue: Grade 0] : Fatigue: Grade 0

## 2022-03-09 ENCOUNTER — NON-APPOINTMENT (OUTPATIENT)
Age: 72
End: 2022-03-09

## 2022-03-09 ENCOUNTER — APPOINTMENT (OUTPATIENT)
Dept: CARDIOLOGY | Facility: CLINIC | Age: 72
End: 2022-03-09
Payer: MEDICARE

## 2022-03-09 VITALS
SYSTOLIC BLOOD PRESSURE: 158 MMHG | DIASTOLIC BLOOD PRESSURE: 88 MMHG | WEIGHT: 167 LBS | BODY MASS INDEX: 25.31 KG/M2 | HEART RATE: 53 BPM | OXYGEN SATURATION: 99 % | RESPIRATION RATE: 16 BRPM | TEMPERATURE: 96.2 F | HEIGHT: 68 IN

## 2022-03-09 VITALS — DIASTOLIC BLOOD PRESSURE: 88 MMHG | SYSTOLIC BLOOD PRESSURE: 144 MMHG

## 2022-03-09 PROCEDURE — 93000 ELECTROCARDIOGRAM COMPLETE: CPT

## 2022-03-09 PROCEDURE — 99213 OFFICE O/P EST LOW 20 MIN: CPT

## 2022-03-09 RX ORDER — PSYLLIUM HUSK 0.4 G
1000-800 CAPSULE ORAL
Qty: 90 | Refills: 3 | Status: DISCONTINUED | COMMUNITY
Start: 2021-03-11 | End: 2022-03-09

## 2022-03-09 NOTE — DISCUSSION/SUMMARY
[Cardiomyopathy] : cardiomyopathy [Coronary Artery Disease] : coronary artery disease [Stable] : stable [FreeTextEntry1] : \par Currently stable from a cardiovascular standpoint. Hypertensive today (usually normal BPs). History of ischemic cardiomyopathy (LVEF 45%). Currently euvolemic. Stable CAD (s/p distal RCA stent). Asymptomatic. Continue current medications. ECG completed today and reviewed (findings as noted above). Follow up in 6 months.

## 2022-03-09 NOTE — CARDIOLOGY SUMMARY
[de-identified] : \par 03/09/22 - normal sinus rhythm, normal ECG\par  [de-identified] : \par 10/29/17 - normal LA, mild segmental LV systolic dysfunction, normal RV size and function, LVEF 45%\par  [de-identified] : \par 10/29/17 (PCI) - RESOLUTE MELA stent to dRCA 99%\par 10/29/17 (CATH) - pRCA 20%, dRCA 99%, LVEF 50%

## 2022-06-15 LAB — PSA SERPL-MCNC: <0.01 NG/ML

## 2022-06-17 ENCOUNTER — APPOINTMENT (OUTPATIENT)
Dept: RADIATION ONCOLOGY | Facility: CLINIC | Age: 72
End: 2022-06-17
Payer: MEDICARE

## 2022-06-17 VITALS
TEMPERATURE: 96.6 F | OXYGEN SATURATION: 96 % | RESPIRATION RATE: 17 BRPM | BODY MASS INDEX: 24.66 KG/M2 | DIASTOLIC BLOOD PRESSURE: 79 MMHG | SYSTOLIC BLOOD PRESSURE: 133 MMHG | WEIGHT: 162.7 LBS | HEIGHT: 68 IN | HEART RATE: 58 BPM

## 2022-06-17 PROCEDURE — 99212 OFFICE O/P EST SF 10 MIN: CPT

## 2022-06-17 RX ORDER — LEUPROLIDE ACETATE 45 MG/.375ML
45 INJECTION, SUSPENSION, EXTENDED RELEASE SUBCUTANEOUS
Qty: 1 | Refills: 0 | Status: DISCONTINUED | OUTPATIENT
Start: 2021-09-16 | End: 2022-06-17

## 2022-06-17 NOTE — REVIEW OF SYSTEMS
[IPSS Score (0-40): ___] : IPSS score: [unfilled] [EPIC-CP Score (0-60): ___] : EPIC-CP score: [unfilled] [Negative] : Heme/Lymph [Constipation: Grade 0] : Constipation: Grade 0 [Diarrhea: Grade 0] : Diarrhea: Grade 0 [Fatigue: Grade 0] : Fatigue: Grade 0 [Urinary Incontinence: Grade 0] : Urinary Incontinence: Grade 0  [Urinary Retention: Grade 0] : Urinary Retention: Grade 0 [Urinary Tract Pain: Grade 0] : Urinary Tract Pain: Grade 0 [Urinary Urgency: Grade 0] : Urinary Urgency: Grade 0

## 2022-06-17 NOTE — HISTORY OF PRESENT ILLNESS
[FreeTextEntry1] : Mr. Barry is a 72 year old male who is being seen for follow up visit.  He is unaccompanied for today's visit.  \par  \par Diagnosis:  rT3a N0 M0, Prostate adenocarcinoma, [place] 3+4=7, 7/14 cores involved, up to 80% involvement, T3a - bulging of the capsule bilaterally and extension into the right neurovascular bundle on the right.   PSA 7.99 NG/ML on 9/9/20.  \par \par \par Radiation\par Treatment Dates: 4/5/2021 - 5/10/2021\par Prostate/SV/Nodes  IMRT  7,020 cGy in 26 fx  with 12 months ADT, last Eligard 9/2021 (Dr. Shi - urology)\par CLINICAL COURSE:  Tolerated his radiation well without significant acute side effects.  \par \par PSA Trend:\par 3/18/19 - 6.30 ng/mL\par 11/6/19 - 6.57\par 9/9/20 - 7.99\par 6/7/21 - < 0.01 \par 9/7/21 -  < 0.01 \par 1/4/22 -  <0.01\par 6/13/22 - < 0.01\par \par \par 6/17/22 - presents for follow up visit.  Mr. Barry is feeling well today and has no complaints.  Denies any bowel issues and urinary symptoms are stable.  He continues to follow with Dr. Shi (urology).  \par IPSS 3 / EPIC 0\par

## 2022-09-28 ENCOUNTER — APPOINTMENT (OUTPATIENT)
Dept: CARDIOLOGY | Facility: CLINIC | Age: 72
End: 2022-09-28

## 2022-09-28 ENCOUNTER — NON-APPOINTMENT (OUTPATIENT)
Age: 72
End: 2022-09-28

## 2022-09-28 VITALS
SYSTOLIC BLOOD PRESSURE: 113 MMHG | WEIGHT: 165 LBS | OXYGEN SATURATION: 96 % | HEIGHT: 68 IN | BODY MASS INDEX: 25.01 KG/M2 | TEMPERATURE: 97 F | DIASTOLIC BLOOD PRESSURE: 72 MMHG | HEART RATE: 53 BPM

## 2022-09-28 PROCEDURE — 93000 ELECTROCARDIOGRAM COMPLETE: CPT

## 2022-09-28 PROCEDURE — 99213 OFFICE O/P EST LOW 20 MIN: CPT

## 2022-09-28 NOTE — HISTORY OF PRESENT ILLNESS
[FreeTextEntry1] : Currently doing well. Denies chest pain, shortness of breath or palpitations. Walks for exercise.

## 2022-09-28 NOTE — DISCUSSION/SUMMARY
[Cardiomyopathy] : cardiomyopathy [Coronary Artery Disease] : coronary artery disease [Stable] : stable [FreeTextEntry1] : \par Currently stable from a cardiovascular standpoint. Normotensive. History of ischemic cardiomyopathy (LVEF 45%). Currently euvolemic. Stable CAD (s/p distal RCA stent). Asymptomatic. Patient with prostate cancer treated with radiation (26 treatments). Continue current medications. ECG completed today and reviewed (findings as noted above). Follow up in 6 months. [EKG obtained to assist in diagnosis and management of assessed problem(s)] : EKG obtained to assist in diagnosis and management of assessed problem(s)

## 2022-09-28 NOTE — PHYSICAL EXAM
[Well Developed] : well developed [Well Nourished] : well nourished [No Acute Distress] : no acute distress [Normal Conjunctiva] : normal conjunctiva [Normal Venous Pressure] : normal venous pressure [No Carotid Bruit] : no carotid bruit [No Murmur] : no murmur [No Rub] : no rub [No Gallop] : no gallop [Clear Lung Fields] : clear lung fields [Good Air Entry] : good air entry [No Respiratory Distress] : no respiratory distress  [Soft] : abdomen soft [Non Tender] : non-tender [Normal Gait] : normal gait [No Edema] : no edema [No Cyanosis] : no cyanosis [No Rash] : no rash [No Skin Lesions] : no skin lesions [Moves all extremities] : moves all extremities [No Focal Deficits] : no focal deficits [Normal Speech] : normal speech [Alert and Oriented] : alert and oriented [de-identified] : bradycardia

## 2022-09-28 NOTE — CARDIOLOGY SUMMARY
[de-identified] : \par 09/28/22 - sinus bradycardia, 54 bpm, low voltage\par  [de-identified] : \par 10/29/17 - normal LA, mild segmental LV systolic dysfunction, normal RV size and function, LVEF 45%\par  [de-identified] : \par 10/29/17 (PCI) - RESOLUTE MELA stent to dRCA 99%\par 10/29/17 (CATH) - pRCA 20%, dRCA 99%, LVEF 50%

## 2022-09-29 ENCOUNTER — APPOINTMENT (OUTPATIENT)
Dept: UROLOGY | Facility: CLINIC | Age: 72
End: 2022-09-29

## 2022-09-29 DIAGNOSIS — Z92.3 PERSONAL HISTORY OF IRRADIATION: ICD-10-CM

## 2022-09-29 PROCEDURE — 99213 OFFICE O/P EST LOW 20 MIN: CPT

## 2022-10-06 NOTE — HISTORY OF PRESENT ILLNESS
[FreeTextEntry1] : Follow-up prostate cancer.  PSA is undetectable.  Status post radiation therapy with 1 treatment of Eligard injection.  No evidence of recurrent disease.  Follow-up 1 year.

## 2022-10-06 NOTE — LETTER BODY
[FreeTextEntry1] : Raman Palomares MD\par [address]\[place] [address]\par [phone]\par \par Dear Dr. Palomares,\par \par Reason for visit: Prostate cancer\par \par This is a 72 year old man with prostate cancer status post radiation therapy. Patient returns for followup. SInce he was last seen, patient reports no urinary difficulties.  He also saw Dr. Grubbs recently.  By his report he had a negative evaluation for as well.. Patient denies any interval complaints or difficulties. He  denies any dysuria or gross hematuria.Patient denies any changes in health. The past medical history and family history and social history are unchanged. All other review of systems are negative. Patient denies any changes in medications. Medication list was reconciled.\par \par On examination, the patient is in no acute distress. He is alert and oriented follows commands. He has normal mood and affect. He is normocephalic. Oral no thrush. Neck is supple. Respirations are unlabored. His abdomen is soft and nontender. Liver is nonpalpable. Bladder is nonpalpable. No CVA tenderness. Neurologically he is grossly intact. No peripheral edema. Skin without gross abnormality. He has normal male external genitalia. Normal meatus. Bilateral testes are descended intrascrotally and normal to palpation. On rectal examination, there is normal sphincter tone. The prostate is clinically benign without focal induration or nodularity.\par \par His recent PSA is less than 0.01.\par \par Assessment: Prostate cancer.  No evidence of disease..\par \par I counseled the patient.  I reassured the patient.  His PSA is undetectable.  I asked him to contact me if he develops any urinary difficulties.  We will follow-up again in 1 years time.Risks and alternatives were discussed. I answered the patient questions. The patient will follow-up as directed and will contact me with any questions or concerns. Thank you for the opportunity to participate in the care of this patient. I'll keep you updated on his progress.\par \par Plan: Follow-up 1 year..

## 2022-10-06 NOTE — LETTER BODY
[FreeTextEntry1] : Raman Palomares MD\par [address]\[place] [address]\par [phone]\par \par Dear Dr. Palomares,\par \par Reason for visit: Prostate cancer\par \par This is a 72 year old man with prostate cancer status post radiation therapy. Patient returns for followup. SInce he was last seen, patient reports no urinary difficulties.  He also saw Dr. Grubbs recently.  By his report he had a negative evaluation for as well.. Patient denies any interval complaints or difficulties. He  denies any dysuria or gross hematuria.Patient denies any changes in health. The past medical history and family history and social history are unchanged. All other review of systems are negative. Patient denies any changes in medications. Medication list was reconciled.\par \par On examination, the patient is in no acute distress. He is alert and oriented follows commands. He has normal mood and affect. He is normocephalic. Oral no thrush. Neck is supple. Respirations are unlabored. His abdomen is soft and nontender. Liver is nonpalpable. Bladder is nonpalpable. No CVA tenderness. Neurologically he is grossly intact. No peripheral edema. Skin without gross abnormality. He has normal male external genitalia. Normal meatus. Bilateral testes are descended intrascrotally and normal to palpation. On rectal examination, there is normal sphincter tone. The prostate is clinically benign without focal induration or nodularity.\par \par His recent PSA is less than 0.01.\par \par Assessment: Prostate cancer.  No evidence of disease..\par \par I counseled the patient.  I reassured the patient.  His PSA is undetectable.  I asked him to contact me if he develops any urinary difficulties.  We will follow-up again in 1 years time.Risks and alternatives were discussed. I answered the patient questions. The patient will follow-up as directed and will contact me with any questions or concerns. Thank you for the opportunity to participate in the care of this patient. I'll keep you updated on his progress.\par \par Plan: Follow-up 1 year..

## 2022-12-15 ENCOUNTER — TRANSCRIPTION ENCOUNTER (OUTPATIENT)
Age: 72
End: 2022-12-15

## 2023-03-07 ENCOUNTER — EMERGENCY (EMERGENCY)
Facility: HOSPITAL | Age: 73
LOS: 1 days | Discharge: HOME | End: 2023-03-07
Attending: EMERGENCY MEDICINE | Admitting: EMERGENCY MEDICINE
Payer: MEDICARE

## 2023-03-07 ENCOUNTER — NON-APPOINTMENT (OUTPATIENT)
Age: 73
End: 2023-03-07

## 2023-03-07 VITALS
DIASTOLIC BLOOD PRESSURE: 66 MMHG | OXYGEN SATURATION: 100 % | HEART RATE: 56 BPM | RESPIRATION RATE: 18 BRPM | SYSTOLIC BLOOD PRESSURE: 141 MMHG | TEMPERATURE: 98 F

## 2023-03-07 VITALS
SYSTOLIC BLOOD PRESSURE: 154 MMHG | OXYGEN SATURATION: 100 % | RESPIRATION RATE: 20 BRPM | DIASTOLIC BLOOD PRESSURE: 79 MMHG | TEMPERATURE: 98 F | HEART RATE: 69 BPM

## 2023-03-07 DIAGNOSIS — Z90.49 ACQUIRED ABSENCE OF OTHER SPECIFIED PARTS OF DIGESTIVE TRACT: Chronic | ICD-10-CM

## 2023-03-07 LAB
ALBUMIN SERPL ELPH-MCNC: 4.4 G/DL — SIGNIFICANT CHANGE UP (ref 3.3–5)
ALP SERPL-CCNC: 127 U/L — HIGH (ref 40–120)
ALT FLD-CCNC: 23 U/L — SIGNIFICANT CHANGE UP (ref 4–41)
ANION GAP SERPL CALC-SCNC: 13 MMOL/L — SIGNIFICANT CHANGE UP (ref 7–14)
APPEARANCE UR: CLEAR — SIGNIFICANT CHANGE UP
AST SERPL-CCNC: 29 U/L — SIGNIFICANT CHANGE UP (ref 4–40)
BACTERIA # UR AUTO: NEGATIVE — SIGNIFICANT CHANGE UP
BASE EXCESS BLDV CALC-SCNC: -2 MMOL/L — SIGNIFICANT CHANGE UP (ref -2–3)
BASOPHILS # BLD AUTO: 0.01 K/UL — SIGNIFICANT CHANGE UP (ref 0–0.2)
BASOPHILS NFR BLD AUTO: 0.1 % — SIGNIFICANT CHANGE UP (ref 0–2)
BILIRUB SERPL-MCNC: 0.8 MG/DL — SIGNIFICANT CHANGE UP (ref 0.2–1.2)
BILIRUB UR-MCNC: NEGATIVE — SIGNIFICANT CHANGE UP
BLOOD GAS VENOUS COMPREHENSIVE RESULT: SIGNIFICANT CHANGE UP
BUN SERPL-MCNC: 20 MG/DL — SIGNIFICANT CHANGE UP (ref 7–23)
CALCIUM SERPL-MCNC: 9.3 MG/DL — SIGNIFICANT CHANGE UP (ref 8.4–10.5)
CHLORIDE BLDV-SCNC: 103 MMOL/L — SIGNIFICANT CHANGE UP (ref 96–108)
CHLORIDE SERPL-SCNC: 103 MMOL/L — SIGNIFICANT CHANGE UP (ref 98–107)
CO2 BLDV-SCNC: 27.5 MMOL/L — HIGH (ref 22–26)
CO2 SERPL-SCNC: 23 MMOL/L — SIGNIFICANT CHANGE UP (ref 22–31)
COLOR SPEC: YELLOW — SIGNIFICANT CHANGE UP
CREAT SERPL-MCNC: 1.38 MG/DL — HIGH (ref 0.5–1.3)
DIFF PNL FLD: ABNORMAL
EGFR: 53 ML/MIN/1.73M2 — LOW
EOSINOPHIL # BLD AUTO: 0.01 K/UL — SIGNIFICANT CHANGE UP (ref 0–0.5)
EOSINOPHIL NFR BLD AUTO: 0.1 % — SIGNIFICANT CHANGE UP (ref 0–6)
GAS PNL BLDV: 139 MMOL/L — SIGNIFICANT CHANGE UP (ref 136–145)
GLUCOSE BLDV-MCNC: 157 MG/DL — HIGH (ref 70–99)
GLUCOSE SERPL-MCNC: 159 MG/DL — HIGH (ref 70–99)
GLUCOSE UR QL: NEGATIVE — SIGNIFICANT CHANGE UP
HCO3 BLDV-SCNC: 26 MMOL/L — SIGNIFICANT CHANGE UP (ref 22–29)
HCT VFR BLD CALC: 43.5 % — SIGNIFICANT CHANGE UP (ref 39–50)
HCT VFR BLDA CALC: 46 % — SIGNIFICANT CHANGE UP (ref 39–51)
HGB BLD CALC-MCNC: 15.2 G/DL — SIGNIFICANT CHANGE UP (ref 12.6–17.4)
HGB BLD-MCNC: 14.5 G/DL — SIGNIFICANT CHANGE UP (ref 13–17)
IANC: 9.31 K/UL — HIGH (ref 1.8–7.4)
IMM GRANULOCYTES NFR BLD AUTO: 0.5 % — SIGNIFICANT CHANGE UP (ref 0–0.9)
KETONES UR-MCNC: NEGATIVE — SIGNIFICANT CHANGE UP
LACTATE BLDV-MCNC: 2.7 MMOL/L — HIGH (ref 0.5–2)
LEUKOCYTE ESTERASE UR-ACNC: NEGATIVE — SIGNIFICANT CHANGE UP
LIDOCAIN IGE QN: 11 U/L — SIGNIFICANT CHANGE UP (ref 7–60)
LYMPHOCYTES # BLD AUTO: 1.09 K/UL — SIGNIFICANT CHANGE UP (ref 1–3.3)
LYMPHOCYTES # BLD AUTO: 9.9 % — LOW (ref 13–44)
MCHC RBC-ENTMCNC: 32.4 PG — SIGNIFICANT CHANGE UP (ref 27–34)
MCHC RBC-ENTMCNC: 33.3 GM/DL — SIGNIFICANT CHANGE UP (ref 32–36)
MCV RBC AUTO: 97.3 FL — SIGNIFICANT CHANGE UP (ref 80–100)
MONOCYTES # BLD AUTO: 0.55 K/UL — SIGNIFICANT CHANGE UP (ref 0–0.9)
MONOCYTES NFR BLD AUTO: 5 % — SIGNIFICANT CHANGE UP (ref 2–14)
NEUTROPHILS # BLD AUTO: 9.31 K/UL — HIGH (ref 1.8–7.4)
NEUTROPHILS NFR BLD AUTO: 84.4 % — HIGH (ref 43–77)
NITRITE UR-MCNC: NEGATIVE — SIGNIFICANT CHANGE UP
NRBC # BLD: 0 /100 WBCS — SIGNIFICANT CHANGE UP (ref 0–0)
NRBC # FLD: 0 K/UL — SIGNIFICANT CHANGE UP (ref 0–0)
PCO2 BLDV: 55 MMHG — SIGNIFICANT CHANGE UP (ref 42–55)
PH BLDV: 7.28 — LOW (ref 7.32–7.43)
PH UR: 5.5 — SIGNIFICANT CHANGE UP (ref 5–8)
PLATELET # BLD AUTO: 158 K/UL — SIGNIFICANT CHANGE UP (ref 150–400)
PO2 BLDV: 29 MMHG — SIGNIFICANT CHANGE UP (ref 25–45)
POTASSIUM BLDV-SCNC: 4.2 MMOL/L — SIGNIFICANT CHANGE UP (ref 3.5–5.1)
POTASSIUM SERPL-MCNC: 4.1 MMOL/L — SIGNIFICANT CHANGE UP (ref 3.5–5.3)
POTASSIUM SERPL-SCNC: 4.1 MMOL/L — SIGNIFICANT CHANGE UP (ref 3.5–5.3)
PROT SERPL-MCNC: 7.2 G/DL — SIGNIFICANT CHANGE UP (ref 6–8.3)
PROT UR-MCNC: NEGATIVE — SIGNIFICANT CHANGE UP
RBC # BLD: 4.47 M/UL — SIGNIFICANT CHANGE UP (ref 4.2–5.8)
RBC # FLD: 12.5 % — SIGNIFICANT CHANGE UP (ref 10.3–14.5)
RBC CASTS # UR COMP ASSIST: 1 /HPF — SIGNIFICANT CHANGE UP (ref 0–4)
SAO2 % BLDV: 42.4 % — LOW (ref 67–88)
SODIUM SERPL-SCNC: 139 MMOL/L — SIGNIFICANT CHANGE UP (ref 135–145)
SP GR SPEC: 1.02 — SIGNIFICANT CHANGE UP (ref 1.01–1.05)
UROBILINOGEN FLD QL: SIGNIFICANT CHANGE UP
WBC # BLD: 11.02 K/UL — HIGH (ref 3.8–10.5)
WBC # FLD AUTO: 11.02 K/UL — HIGH (ref 3.8–10.5)
WBC UR QL: 1 /HPF — SIGNIFICANT CHANGE UP (ref 0–5)

## 2023-03-07 PROCEDURE — 74018 RADEX ABDOMEN 1 VIEW: CPT | Mod: 26

## 2023-03-07 PROCEDURE — 74177 CT ABD & PELVIS W/CONTRAST: CPT | Mod: 26,MA

## 2023-03-07 PROCEDURE — 99285 EMERGENCY DEPT VISIT HI MDM: CPT

## 2023-03-07 RX ORDER — KETOROLAC TROMETHAMINE 30 MG/ML
15 SYRINGE (ML) INJECTION ONCE
Refills: 0 | Status: DISCONTINUED | OUTPATIENT
Start: 2023-03-07 | End: 2023-03-07

## 2023-03-07 RX ORDER — TAMSULOSIN HYDROCHLORIDE 0.4 MG/1
1 CAPSULE ORAL
Qty: 7 | Refills: 0
Start: 2023-03-07 | End: 2023-03-13

## 2023-03-07 RX ORDER — SODIUM CHLORIDE 9 MG/ML
1000 INJECTION INTRAMUSCULAR; INTRAVENOUS; SUBCUTANEOUS ONCE
Refills: 0 | Status: COMPLETED | OUTPATIENT
Start: 2023-03-07 | End: 2023-03-07

## 2023-03-07 RX ORDER — ONDANSETRON 8 MG/1
4 TABLET, FILM COATED ORAL ONCE
Refills: 0 | Status: COMPLETED | OUTPATIENT
Start: 2023-03-07 | End: 2023-03-07

## 2023-03-07 RX ORDER — OXYCODONE HYDROCHLORIDE 5 MG/1
1 TABLET ORAL
Qty: 8 | Refills: 0
Start: 2023-03-07 | End: 2023-03-08

## 2023-03-07 RX ORDER — ONDANSETRON 8 MG/1
1 TABLET, FILM COATED ORAL
Qty: 12 | Refills: 0
Start: 2023-03-07 | End: 2023-03-09

## 2023-03-07 RX ADMIN — SODIUM CHLORIDE 1000 MILLILITER(S): 9 INJECTION INTRAMUSCULAR; INTRAVENOUS; SUBCUTANEOUS at 04:11

## 2023-03-07 RX ADMIN — Medication 15 MILLIGRAM(S): at 04:12

## 2023-03-07 RX ADMIN — ONDANSETRON 4 MILLIGRAM(S): 8 TABLET, FILM COATED ORAL at 04:11

## 2023-03-07 RX ADMIN — Medication 15 MILLIGRAM(S): at 04:45

## 2023-03-07 NOTE — ED ADULT NURSE NOTE - OBJECTIVE STATEMENT
patient came to emergency room with complaint of right flank pain . alert and oriented. denies any other urinary symptoms. as per pt he has a h/o kidney stones. labs sent right ac 20g iv placed. awaiting for ct

## 2023-03-07 NOTE — ED PROVIDER NOTE - PATIENT PORTAL LINK FT
You can access the FollowMyHealth Patient Portal offered by [facility] by registering at the following website: [email or web address]. By joining [email or web address]’s FollowMyHealth portal, you will also be able to view your health information using other applications (apps) compatible with our system.

## 2023-03-07 NOTE — CONSULT NOTE ADULT - SUBJECTIVE AND OBJECTIVE BOX
HPI  Patient is a 72yo with a history of nephrolithiases ~15 years ago not requiring surgical intervention and prostate cancer (high volume 3+4) s/p radiation presenting with sharp intermittent abdominal/flank pain since yesterday. On CT scan patient found to have a 8mm proximal R ureteral stone with associated R hydroureteronephrosis. Patient also found to have mild OMA with Cr of 1.38 from baseline of ~1. Patient states that he had intermittent flank pain since yesterday evening which was similar to prior stone episodes. Patient stated that he has associated nausea and one bout of emesis yesterday evening. Patient denies any fevers, chills, hematuria or other systemic signs of infection. Patient states that he has had prior stone episodes which have all passed spontaneously with medical expulsive therapy. At this time the patient would not like any surgical intervention for the stone and would prefer conservative management.     PAST MEDICAL & SURGICAL HISTORY:  Kidney stone    S/P appendectomy  CAD  Dyslipidemia  Ischemic cardiomyopathy         MEDICATIONS  (STANDING):    MEDICATIONS  (PRN):      FAMILY HISTORY:  Family history of coronary artery disease in grandmother (Grandparent)  Family history of coronary artery disease in grandfather (Grandparent)        Allergies    No Known Allergies    Intolerances        SOCIAL HISTORY:    REVIEW OF SYSTEMS: Otherwise negative as stated in HPI    Physical Exam  Vital signs  T(C): 36.8 (23 @ 05:13), Max: 36.8 (23 @ 01:34)  HR: 63 (23 @ 05:13)  BP: 135/70 (23 @ 05:13)  SpO2: 100% (23 @ 05:13)  Wt(kg): --    Output      Gen:  NAD    Pulm:  No respiratory distress  	  CV:  RRR    GI:  S/ND/NT. Patient has CVA tenderness over the R flank. No evidence of CVA tenderness over the L flank.    :    MSK:      LABS:       @ 04:00    WBC 11.02 / Hct 43.5  / SCr 1.38         139  |  103  |  20  ----------------------------<  159<H>  4.1   |  23  |  1.38<H>    Ca    9.3      07 Mar 2023 04:00    TPro  7.2  /  Alb  4.4  /  TBili  0.8  /  DBili  x   /  AST  29  /  ALT  23  /  AlkPhos  127<H>  03-07      Urinalysis Basic - ( 07 Mar 2023 04:00 )    Color: Yellow / Appearance: Clear / S.024 / pH: x  Gluc: x / Ketone: Negative  / Bili: Negative / Urobili: <2 mg/dL   Blood: x / Protein: Negative / Nitrite: Negative   Leuk Esterase: Negative / RBC: 1 /HPF / WBC 1 /HPF   Sq Epi: x / Non Sq Epi: x / Bacteria: Negative        Urine Cx: Pending  Blood Cx:    RADIOLOGY:

## 2023-03-07 NOTE — ED PROVIDER NOTE - NSFOLLOWUPINSTRUCTIONS_ED_ALL_ED_FT
You have been evaluated in the Emergency Department today for right flank pain. Your evaluation showed that you have a right 8 mm kidney stone.    Dr. Shi's office will contact you to schedule an appointment for 3/9/23. You can also call them at [phone].     For pain, you can take TYLENOL/ACETAMINOPHEN up to 4,000mg a day for your symptoms in four divided doses and MOTRIN/IBUPROFEN up to 2,400mg a day in four divided doses (for up to 5 days with food).    For breakthrough pain that is not controlled with Tylenol and Motrin, you can take the prescribed Oxycodone.    Please also take the prescribed Flomax once a day.    For nausea, please take the prescribed Zofran as needed.    Please also take a stool softener such as Colace or Miralax.     Please be sure to drink plenty of fluids and strain your urine.    Return to the Emergency Department if you experience worsening or uncontrolled pain, fevers 100.4°F or greater, recurrent vomiting, inability to tolerate food or fluids by mouth, or any other concerning symptoms.    Thank you for choosing us for your care.

## 2023-03-07 NOTE — ED PROVIDER NOTE - PROGRESS NOTE DETAILS
Taz PGY2  Patient reassessed at bedside and reports that his pain is much better after the medication. Currently offers no other complaints. Pending CTr. Taz PGY2  Consulted uro for further management of 8mm proximal ureter stone. Kavita Trevino M.D. (Resident Physician): Pt will f/u with urology on 3/9. Will dc with pain meds. Uro wanted a KUB and will f/u the results on 3/9.

## 2023-03-07 NOTE — ED PROVIDER NOTE - ATTENDING CONTRIBUTION TO CARE
I performed a face-to-face evaluation of the patient and performed a history and physical examination along with the resident or ACP, and/or medical student above.  I agree with the history and physical examination as documented by the resident or ACP, and/or medical student above.  Zee:  Gen: Alert, NAD  Head: NC, AT,  EOMI, normal lids/conjunctiva  ENT:  normal hearing, patent oropharynx without erythema/exudate  Neck: +supple, no tenderness/meningismus/JVD, +Trachea midline  Chest: no chest wall tenderness, equal chest rise  Pulm: Bilateral BS, normal resp effort, no wheeze/stridor/retractions  CV: RRR, no M/R/G, +dist pulses  Abd: +BS, soft, ND, +RLQ ttp, no rebound  back: +R CVAT  Rectal: deferred  Mskel: no edema/erythema/cyanosis  Skin: no rash  Neuro: AAOx3

## 2023-03-07 NOTE — ED PROVIDER NOTE - PHYSICAL EXAMINATION
Vitals: I have reviewed the patients vital signs  General: Well dressed, well appearing, no acute distress  HEENT: Atraumatic, normocephalic, airway patent  Eyes: EOMI, tracking appropriately  Neck: no tracheal deviation, no JVD  Chest/Lungs: no trauma, symmetric chest rise, speaking in complete sentences, no WOB  Heart: skin and extremities well perfused, regular rate and rhythm  Abdomen: soft, RLQ tenderness, R CVA tenderness  Neuro: A+Ox3, ambulating without difficulty, CN grossly intact  MSK: strength at baseline in all extremities, no muscle wasting or atrophy  Skin: no cyanosis, no jaundice, no new emergent lesions

## 2023-03-07 NOTE — ED PROVIDER NOTE - CLINICAL SUMMARY MEDICAL DECISION MAKING FREE TEXT BOX
Patient is a 73-year-old male with history of hypertension, hyperlipidemia, appendectomy and right kidney stone (not requiring intervention) presents with right flank pain. Patient is afebrile and hemodynamically stable.  Exam is remarkable for right lower quadrant pain and right CVA tenderness.  DDx includes kidney stone versus pyelo versus colitis versus UTI. Will obtain labs, UA/UC and CT scan of abdomen and pelvis. Symptomatic treatment with fluids, toradol and zofran.

## 2023-03-07 NOTE — CONSULT NOTE ADULT - ASSESSMENT
Assessment: Patient is a 74yo M with a history of nephrolithiasis who is presenting with flank pain secondary to a 8mm R proximal ureteral stone with associated hydroureteronephrosis. Patient is currently hemodynamically stable and would prefer conservative management for the nephrolithiasis.    Recommendations  - F/u urine culture  - Medical expulsive therapy with flomax 0.4mg qday  - Pain control with standing tylenol 1000mg q6 and PRN oxycodone 5mg for breakthrough pain  - IVF (LR @ 125cc)  - F/u with Dr. Shi in 2 weeks     Assessment: Patient is a 72yo M with a history of nephrolithiasis who is presenting with flank pain secondary to a 8mm R proximal ureteral stone with associated hydroureteronephrosis. Patient is currently hemodynamically stable and would prefer conservative management for the nephrolithiasis.    Recommendations  -Trial of medical expulsive therapy with PO pain control, given the stone burden and size of the stone, patient will likely need to have surgical management of stone to be done outpatient. Will need follow up with Dr. Shi this week to plan and discuss options.   -Discussed options with patient as well at the bedside.  -Zofran PRN nausea  -Senna, colace, miralax  -Aggressive hydration  -Strain urine for calculi  - F/u urine culture  - Medical expulsive therapy with flomax 0.4mg qday  - Pain control with standing tylenol 1000mg q6 and PRN oxycodone 5mg for breakthrough pain  - F/u with Dr. Shi on Thursday 3/9/23 for follow up and surgical planning, attending aware of appointment, office to reach out for follow up appointment details.     [facility]  [address] [place] [address] [phone]       Assessment: Patient is a 74yo M with a history of nephrolithiasis who is presenting with flank pain secondary to a 8mm R proximal ureteral stone with associated hydroureteronephrosis. Patient is currently hemodynamically stable and would prefer conservative management for the nephrolithiasis.    Recommendations  -Please obtain KUB while in the ED.   -Trial of medical expulsive therapy with PO pain control, given the stone burden and size of the stone, patient will likely need to have surgical management of stone to be done outpatient. Will need follow up with Dr. Shi this week to plan and discuss options.   -Discussed options with patient as well at the bedside.  -Zofran PRN nausea  -Senna, colace, miralax  -Aggressive hydration  -Strain urine for calculi  -F/u urine culture  -Medical expulsive therapy with flomax 0.4mg qday  -Pain control with standing tylenol 1000mg q6 and PRN oxycodone 5mg for breakthrough pain  -F/u with Dr. Shi on Thursday 3/9/23 for follow up and surgical planning, attending aware of appointment, office to reach out for follow up appointment details.     [facility]  [address] [place] [phone]

## 2023-03-07 NOTE — ED PROVIDER NOTE - OBJECTIVE STATEMENT
Patient is a 73-year-old male with history of hypertension, hyperlipidemia, appendectomy and right kidney stone (not requiring intervention) presents with right flank pain.  Reports that it started last night around dinnertime, located in the right flank with radiation to the right lower quadrant, with nausea and one episode of emesis.  Currently he reports that the pain has subsided compared to when he first arrived to the emergency department.  Denies fever chills, dysuria, hematuria, urinary frequency.

## 2023-03-08 LAB
CULTURE RESULTS: SIGNIFICANT CHANGE UP
SPECIMEN SOURCE: SIGNIFICANT CHANGE UP

## 2023-03-09 ENCOUNTER — APPOINTMENT (OUTPATIENT)
Dept: UROLOGY | Facility: CLINIC | Age: 73
End: 2023-03-09
Payer: MEDICARE

## 2023-03-09 PROCEDURE — 99214 OFFICE O/P EST MOD 30 MIN: CPT

## 2023-03-09 NOTE — ADDENDUM
[FreeTextEntry1] : Entered by Toy Briseno, acting as scribe for Dr. Silvio Shi.\par The documentation recorded by the scribe accurately reflects the service I personally performed and the decisions made by me.

## 2023-03-09 NOTE — LETTER BODY
[FreeTextEntry1] : Raman Palmoares MD\par [address]\[place] [address]\par [phone]\par \par Dear Dr. Palomares,\par \par Reason for visit: Prostate cancer\par \par This is a 73 year old man with prostate cancer status post radiation therapy. Patient returns for followup. SInce he was last seen, patient developed right flank pain. He went to the emergency room and was found to have a right ureteral stone. His CT scan demonstrated a right kidney stone. He  denies any dysuria or gross hematuria. Patient denies any changes in health. The past medical history and family history and social history are unchanged. All other review of systems are negative. Patient denies any changes in medications. Medication list was reconciled.\par \par On examination, the patient is in no acute distress. He is alert and oriented follows commands. He has normal mood and affect. He is normocephalic. Oral no thrush. Neck is supple. Respirations are unlabored. His abdomen is soft and nontender. Liver is nonpalpable. Bladder is nonpalpable. No CVA tenderness. Neurologically he is grossly intact. No peripheral edema. Skin without gross abnormality. He has normal male external genitalia. Normal meatus. Bilateral testes are descended intrascrotally and normal to palpation. On rectal examination, there is normal sphincter tone. The prostate is clinically benign without focal induration or nodularity.\par \par Assessment: Prostate cancer.  No evidence of disease. Right ureteral stone.\par \par I counseled the patient.  I reassured the patient. In terms of his prostate cancer, I recommended that he repeat PSA as directed. In terms of his right ureteral stone, I recommended that he obtain a renal ultrasound in 3 weeks. I encouraged him to begin a trial of Flomax. I discussed the potential side effects of the medication. I counseled the patient on its use and side effects. If the patient develops any side effects, the patient will discontinue the medication and contact me. Risks and alternatives were discussed. I answered the patient questions. The patient will follow-up as directed and will contact me with any questions or concerns. Thank you for the opportunity to participate in the care of this patient. I'll keep you updated on his progress.\par \par Plan: Trial of Flomax. PSA as directed. Follow up for renal ultrasound in 3 weeks.

## 2023-03-09 NOTE — LETTER BODY
[FreeTextEntry1] : Raman Palomares MD\par [address]\[place] [address]\par [phone]\par \par Dear Dr. Palomares,\par \par Reason for visit: Prostate cancer\par \par This is a 73 year old man with prostate cancer status post radiation therapy. Patient returns for followup. SInce he was last seen, patient developed right flank pain. He went to the emergency room and was found to have a right ureteral stone. His CT scan demonstrated a right kidney stone. He  denies any dysuria or gross hematuria. Patient denies any changes in health. The past medical history and family history and social history are unchanged. All other review of systems are negative. Patient denies any changes in medications. Medication list was reconciled.\par \par On examination, the patient is in no acute distress. He is alert and oriented follows commands. He has normal mood and affect. He is normocephalic. Oral no thrush. Neck is supple. Respirations are unlabored. His abdomen is soft and nontender. Liver is nonpalpable. Bladder is nonpalpable. No CVA tenderness. Neurologically he is grossly intact. No peripheral edema. Skin without gross abnormality. He has normal male external genitalia. Normal meatus. Bilateral testes are descended intrascrotally and normal to palpation. On rectal examination, there is normal sphincter tone. The prostate is clinically benign without focal induration or nodularity.\par \par Assessment: Prostate cancer.  No evidence of disease. Right ureteral stone.\par \par I counseled the patient.  I reassured the patient. In terms of his prostate cancer, I recommended that he repeat PSA as directed. In terms of his right ureteral stone, I recommended that he obtain a renal ultrasound in 3 weeks. I encouraged him to begin a trial of Flomax. I discussed the potential side effects of the medication. I counseled the patient on its use and side effects. If the patient develops any side effects, the patient will discontinue the medication and contact me. Risks and alternatives were discussed. I answered the patient questions. The patient will follow-up as directed and will contact me with any questions or concerns. Thank you for the opportunity to participate in the care of this patient. I'll keep you updated on his progress.\par \par Plan: Trial of Flomax. PSA as directed. Follow up for renal ultrasound in 3 weeks.

## 2023-03-10 LAB — PSA SERPL-MCNC: 0.03 NG/ML

## 2023-03-12 NOTE — HISTORY OF PRESENT ILLNESS
[FreeTextEntry1] : Patient developed right flank pain.  He went to emergency room was found to have a right ureteral stone.  CT scan also demonstrated right kidney stone.\par \par Plan Flomax renal ultrasound in 3 weeks.\par \par Patient history of prostate cancer.  Repeat PSA as directed.

## 2023-03-17 ENCOUNTER — APPOINTMENT (OUTPATIENT)
Dept: RADIATION ONCOLOGY | Facility: CLINIC | Age: 73
End: 2023-03-17
Payer: MEDICARE

## 2023-03-17 ENCOUNTER — NON-APPOINTMENT (OUTPATIENT)
Age: 73
End: 2023-03-17

## 2023-03-17 VITALS
HEART RATE: 60 BPM | DIASTOLIC BLOOD PRESSURE: 71 MMHG | HEIGHT: 68 IN | RESPIRATION RATE: 17 BRPM | TEMPERATURE: 97.7 F | OXYGEN SATURATION: 96 % | SYSTOLIC BLOOD PRESSURE: 125 MMHG | BODY MASS INDEX: 25.74 KG/M2 | WEIGHT: 169.86 LBS

## 2023-03-17 PROCEDURE — 99213 OFFICE O/P EST LOW 20 MIN: CPT

## 2023-03-17 RX ORDER — PSYLLIUM HUSK 0.4 G
1000-800 CAPSULE ORAL
Qty: 90 | Refills: 3 | Status: DISCONTINUED | COMMUNITY
Start: 2021-09-16 | End: 2023-03-17

## 2023-03-17 NOTE — REVIEW OF SYSTEMS
[Constipation: Grade 0] : Constipation: Grade 0 [Diarrhea: Grade 0] : Diarrhea: Grade 0 [Hematuria: Grade 0] : Hematuria: Grade 0 [Urinary Incontinence: Grade 1 - Occasional (e.g., with coughing, sneezing, etc.), pads not indicated] : Urinary Incontinence: Grade 1 - Occasional (e.g., with coughing, sneezing, etc.), pads not indicated [Urinary Retention: Grade 0] : Urinary Retention: Grade 0 [Urinary Tract Pain: Grade 0] : Urinary Tract Pain: Grade 0 [Urinary Urgency: Grade 1 - Present] : Urinary Urgency: Grade 1 - Present [Urinary Frequency: Grade 0] : Urinary Frequency: Grade 0 [Ejaculation Disorder: Grade 0] : Ejaculation Disorder: Grade 0 [Erectile Dysfunction: Grade 0] : Erectile Dysfunction: Grade 0 [FreeTextEntry2] : occasional dribble

## 2023-03-17 NOTE — HISTORY OF PRESENT ILLNESS
[FreeTextEntry1] : Mr. Barry is a 73 year old male who is being seen for follow up visit.  \par  \par Diagnosis:  rT3a N0 M0, Prostate adenocarcinoma, [place] 3+4=7, 7/14 cores involved, up to 80% involvement, T3a - bulging of the capsule bilaterally and extension into the right neurovascular bundle on the right.   PSA 7.99 NG/ML on 9/9/20.  \par \par \par RADIATION Treatment: \par Treatment Dates: 4/5/2021 - 5/10/2021\par Prostate/SV/Nodes  IMRT  7,020 cGy in 26 fx  with 12 months ADT, last Eligard 9/2021 (Dr. Shi - urology)\par CLINICAL COURSE:  Tolerated his radiation well without significant acute side effects.  \par \par PSA Trend:\par 3/18/19 - 6.30 ng/mL\par 11/6/19 - 6.57\par 9/9/20 - 7.99\par 6/7/21 - < 0.01 \par 9/7/21 -  < 0.01 \par 1/4/22 -  <0.01\par 6/13/22 - < 0.01\par 3/9/2023 -*0.03 ng/mL\par \par \par 6/17/22 - presented for follow up visit.  Mr. Barry is feeling well today and has no complaints.  Denies any bowel issues and urinary symptoms are stable.  He continues to follow with Dr. Shi (urology).  \par IPSS 3 / EPIC 0    Follow up in March 2023. \par \par 3/17/23 - Patient presents for follow up visit.  \par  PSA obtained 3/9/2023 *0.03 ng/mL\par IPSS:2\par EPIC:2\par Patient reports feeling well overall. Denies bowel changes and delia urinary symptoms.

## 2023-03-29 ENCOUNTER — APPOINTMENT (OUTPATIENT)
Dept: CARDIOLOGY | Facility: CLINIC | Age: 73
End: 2023-03-29
Payer: MEDICARE

## 2023-03-29 ENCOUNTER — NON-APPOINTMENT (OUTPATIENT)
Age: 73
End: 2023-03-29

## 2023-03-29 VITALS
WEIGHT: 169.25 LBS | HEIGHT: 68 IN | SYSTOLIC BLOOD PRESSURE: 107 MMHG | OXYGEN SATURATION: 99 % | HEART RATE: 75 BPM | DIASTOLIC BLOOD PRESSURE: 69 MMHG | BODY MASS INDEX: 25.65 KG/M2

## 2023-03-29 PROCEDURE — 99214 OFFICE O/P EST MOD 30 MIN: CPT

## 2023-03-29 PROCEDURE — 93000 ELECTROCARDIOGRAM COMPLETE: CPT

## 2023-03-29 NOTE — DISCUSSION/SUMMARY
[Cardiomyopathy] : cardiomyopathy [Coronary Artery Disease] : coronary artery disease [Stable] : stable [FreeTextEntry1] : \par Currently stable from a cardiovascular standpoint. Normotensive. History of ischemic cardiomyopathy (LVEF 45%). Currently euvolemic. Stable CAD (s/p distal RCA stent). Asymptomatic. Patient with prostate cancer treated with radiation (26 treatments). Continue current medications including aspirin and statin. ECG completed today and reviewed (findings as noted above). Will schedule an echo to reassess his cardiac structures and function. Follow up in 6 months. [EKG obtained to assist in diagnosis and management of assessed problem(s)] : EKG obtained to assist in diagnosis and management of assessed problem(s)

## 2023-03-29 NOTE — CARDIOLOGY SUMMARY
[de-identified] : \par 03/29/23 - normal sinus rhythm, normal ECG\par  [de-identified] : \par 10/29/17 - normal LA, mild segmental LV systolic dysfunction, normal RV size and function, LVEF 45%\par  [de-identified] : \par 10/29/17 (PCI) - RESOLUTE MELA stent to dRCA 99%\par 10/29/17 (CATH) - pRCA 20%, dRCA 99%, LVEF 50%

## 2023-03-29 NOTE — PHYSICAL EXAM
[Well Developed] : well developed [Well Nourished] : well nourished [No Acute Distress] : no acute distress [Normal Conjunctiva] : normal conjunctiva [Normal Venous Pressure] : normal venous pressure [No Carotid Bruit] : no carotid bruit [No Murmur] : no murmur [No Rub] : no rub [No Gallop] : no gallop [Clear Lung Fields] : clear lung fields [Good Air Entry] : good air entry [No Respiratory Distress] : no respiratory distress  [Soft] : abdomen soft [Non Tender] : non-tender [Normal Gait] : normal gait [No Edema] : no edema [No Cyanosis] : no cyanosis [No Rash] : no rash [No Skin Lesions] : no skin lesions [Moves all extremities] : moves all extremities [No Focal Deficits] : no focal deficits [Normal Speech] : normal speech [Alert and Oriented] : alert and oriented [de-identified] : bradycardia

## 2023-03-29 NOTE — HISTORY OF PRESENT ILLNESS
[FreeTextEntry1] : Currently doing well. Denies chest pain, shortness of breath or palpitations. Has kidney stone which is being monitored.

## 2023-04-04 ENCOUNTER — RX CHANGE (OUTPATIENT)
Age: 73
End: 2023-04-04

## 2023-04-27 ENCOUNTER — APPOINTMENT (OUTPATIENT)
Dept: UROLOGY | Facility: CLINIC | Age: 73
End: 2023-04-27
Payer: MEDICARE

## 2023-04-27 ENCOUNTER — OUTPATIENT (OUTPATIENT)
Dept: OUTPATIENT SERVICES | Facility: HOSPITAL | Age: 73
LOS: 1 days | End: 2023-04-27
Payer: MEDICARE

## 2023-04-27 DIAGNOSIS — N20.1 CALCULUS OF URETER: ICD-10-CM

## 2023-04-27 DIAGNOSIS — C61 MALIGNANT NEOPLASM OF PROSTATE: ICD-10-CM

## 2023-04-27 DIAGNOSIS — R97.20 ELEVATED PROSTATE SPECIFIC ANTIGEN [PSA]: ICD-10-CM

## 2023-04-27 DIAGNOSIS — R35.0 FREQUENCY OF MICTURITION: ICD-10-CM

## 2023-04-27 DIAGNOSIS — Z90.49 ACQUIRED ABSENCE OF OTHER SPECIFIED PARTS OF DIGESTIVE TRACT: Chronic | ICD-10-CM

## 2023-04-27 LAB
ANION GAP SERPL CALC-SCNC: 14 MMOL/L
BASOPHILS # BLD AUTO: 0.01 K/UL
BASOPHILS NFR BLD AUTO: 0.1 %
BUN SERPL-MCNC: 27 MG/DL
CALCIUM SERPL-MCNC: 9.2 MG/DL
CHLORIDE SERPL-SCNC: 106 MMOL/L
CO2 SERPL-SCNC: 25 MMOL/L
CREAT SERPL-MCNC: 1.79 MG/DL
EGFR: 39 ML/MIN/1.73M2
EOSINOPHIL # BLD AUTO: 0.04 K/UL
EOSINOPHIL NFR BLD AUTO: 0.5 %
GLUCOSE SERPL-MCNC: 107 MG/DL
HCT VFR BLD CALC: 40.3 %
HGB BLD-MCNC: 13.8 G/DL
IMM GRANULOCYTES NFR BLD AUTO: 0.3 %
LYMPHOCYTES # BLD AUTO: 1.07 K/UL
LYMPHOCYTES NFR BLD AUTO: 14.5 %
MAN DIFF?: NORMAL
MCHC RBC-ENTMCNC: 33.5 PG
MCHC RBC-ENTMCNC: 34.2 GM/DL
MCV RBC AUTO: 97.8 FL
MONOCYTES # BLD AUTO: 0.51 K/UL
MONOCYTES NFR BLD AUTO: 6.9 %
NEUTROPHILS # BLD AUTO: 5.74 K/UL
NEUTROPHILS NFR BLD AUTO: 77.7 %
PLATELET # BLD AUTO: 134 K/UL
POTASSIUM SERPL-SCNC: 4.1 MMOL/L
RBC # BLD: 4.12 M/UL
RBC # FLD: 12.5 %
SODIUM SERPL-SCNC: 146 MMOL/L
WBC # FLD AUTO: 7.39 K/UL

## 2023-04-27 PROCEDURE — 76775 US EXAM ABDO BACK WALL LIM: CPT | Mod: 26

## 2023-04-27 PROCEDURE — 76775 US EXAM ABDO BACK WALL LIM: CPT

## 2023-04-27 PROCEDURE — 99214 OFFICE O/P EST MOD 30 MIN: CPT

## 2023-04-28 LAB — BACTERIA UR CULT: NORMAL

## 2023-05-03 ENCOUNTER — OUTPATIENT (OUTPATIENT)
Dept: OUTPATIENT SERVICES | Facility: HOSPITAL | Age: 73
LOS: 1 days | End: 2023-05-03
Payer: MEDICARE

## 2023-05-03 VITALS
HEIGHT: 68 IN | SYSTOLIC BLOOD PRESSURE: 124 MMHG | WEIGHT: 164.02 LBS | TEMPERATURE: 99 F | DIASTOLIC BLOOD PRESSURE: 72 MMHG | OXYGEN SATURATION: 97 % | HEART RATE: 88 BPM | RESPIRATION RATE: 18 BRPM

## 2023-05-03 DIAGNOSIS — N20.1 CALCULUS OF URETER: ICD-10-CM

## 2023-05-03 DIAGNOSIS — Z90.49 ACQUIRED ABSENCE OF OTHER SPECIFIED PARTS OF DIGESTIVE TRACT: Chronic | ICD-10-CM

## 2023-05-03 DIAGNOSIS — Z95.5 PRESENCE OF CORONARY ANGIOPLASTY IMPLANT AND GRAFT: Chronic | ICD-10-CM

## 2023-05-03 DIAGNOSIS — Z01.818 ENCOUNTER FOR OTHER PREPROCEDURAL EXAMINATION: ICD-10-CM

## 2023-05-03 PROCEDURE — G0463: CPT

## 2023-05-03 RX ORDER — SODIUM CHLORIDE 9 MG/ML
3 INJECTION INTRAMUSCULAR; INTRAVENOUS; SUBCUTANEOUS EVERY 8 HOURS
Refills: 0 | Status: DISCONTINUED | OUTPATIENT
Start: 2023-05-17 | End: 2023-05-31

## 2023-05-03 RX ORDER — SODIUM CHLORIDE 9 MG/ML
1000 INJECTION, SOLUTION INTRAVENOUS
Refills: 0 | Status: DISCONTINUED | OUTPATIENT
Start: 2023-05-17 | End: 2023-05-31

## 2023-05-03 NOTE — H&P PST ADULT - HISTORY OF PRESENT ILLNESS
74 yo M with PMHx significant for CAD sp stent to RCA in 2017, HTN, HLD, Kidney stone,  72 yo M with PMHx significant for MI, CAD sp stent to RCA in 2017, HTN, HLD, Kidney stone, and Prostate Cancer sp RT (2 years ago) who reports a few month history of right sided flank pain. He is now scheduled for Right Ureteroscopy, Laser Lithotripsy, Stone Extraction, Stent Placement on 5/17/23. 74 yo M with PMHx significant for MI, CAD sp stent to RCA in 2017, HTN, HLD, Kidney stone, and Prostate Cancer sp RT (2 years ago) who reports a few month history of right sided flank pain. He was evaluated at [facility] ED on 3/7/23 for c/o flank pain; he underwent CT A&P which "revealed 8 mm stone in the proximal right ureter with associated mild to moderate hydroureteronephrosis, perinephric/periureteral stranding and delayed   nephrogram," full report in [place]. He is now scheduled for Right Ureteroscopy, Laser Lithotripsy, Stone Extraction, Stent Placement on 5/17/23. 72 yo M with PMHx significant for MI, CAD sp stent to RCA in 2017, HTN, HLD, Kidney stone, and Prostate Cancer sp RT (2 years ago) who reports a few month history of right sided flank pain. He was evaluated at [facility] ED on 3/7/23 for c/o flank pain; he underwent CT A&P which "revealed 8 mm stone in the proximal right ureter with associated mild to moderate hydroureteronephrosis, perinephric/periureteral stranding and delayed nephrogram," full report in [place]. He was treated with IVF and Zofran and recommended for outpatient f/u with Urology. He is now scheduled for Right Ureteroscopy, Laser Lithotripsy, Stone Extraction, Stent Placement on 5/17/23.

## 2023-05-03 NOTE — H&P PST ADULT - NSSUBSTANCEUSE_GEN_ALL_CORE_SD
CRITICAL CARE ADMISSION NOTE      Name: Darshana Light   : 1953   MRN: [id number]   Date: 2023      Reason for ICU Admission: Acute on chronic HFrEF, LVAD workup     ICU PROBLEM LIST   Acute on chronic HFrEF (20%) NYHA IIIb/IV (D) on home inotropic support, life vest  TANNER on CKD3  Aflutter w/ RVR  Acute on chronic hypoxemic respiratory failure  CAP  CAD  Gastric neuroendocrine tumor  Hx of LUE DVT  DM  PAD  TRISTAN  BPH w/ urinary retention requiring chronic donnelly    HISTORY OF PRESENT ILLNESS:   Pt is a 68 y.o M w/ PMH as noted above admitted to [facility] on  due to ongoing symptoms secondary to his HFrEF. Treated for possible CAP, and diuresed for acute on chronic HFrEF. Nephrology following for TANNER on CKD 3. AHF team recommended transfer to CVICU for [facility] placement and ongoing LVAD workup with plan for placement 2/15.     24 HOUR EVENTS:   Clinically unchanged, on milrinone and dobutamine. No complaints this AM    NEUROLOGICAL:    Mentation appropriate  Delirium precautions  Encourage work w/ PT/OT    PULMONOLOGY:   O2 per NC PRN for spO2 >92%    CARDIOVASCULAR:   Scheduled and PRN bumex  Goal net negative  C/w milrinone, dobutamine  Telemetry, close hemodynamic monitoring  Unable to tolerate BB, ARNI/ARB due to , aldactone held 2/2 elevated K   C/w ASA, amiodarone, statin  Will need Life Vest replaced  Ongoing LVAD workup by AHF team, plan for LVAD placement 2/15  Monitor for signs of hypoperfusion, monitor UOP    GASTROINTESTINAL:   PPI  Cardiac, renal, diabetic diet as tolerated      RENAL/ELECTROLYTE/FLUIDS:   Strict I/Os  Nephrology following  Monitor RFP  Mg/Phos/K >2/3/4  Donnelly to remain in place  PRN bumex    ENDOCRINE:   SSI, Basal insulin  Hypoglycemic protocol  Glucose goal 120-180    HEMATOLOGY/ONCOLOGY:   On eliquis  EPO weekly  Gastric neuroendocrine tumor, well differentiated, good prognosis per onc.    Not barrier to LVAD implant    ID/MICRO:   S/p CAP treatment     ICU DAILY CHECKLIST Code Status:Full  DVT Prophylaxis:Eliquis  T/L/D: PIV,  [place]  SUP: PPI  Diet: ADAT  Activity Level:ad jose f  ABCDEF Bundle/Checklist Completed:Yes  Disposition: Stay in ICU, likely TTF in AM  Multidisciplinary Rounds Completed:  yes  Patient/Family Updated: Yes    [place]   2/3 Transferred to CVICU for [facility] placement    SUBJECTIVE:     As noted above    Review of Systems:     Review of Systems   Constitutional:  Positive for malaise/fatigue. Negative for chills and fever. HENT:  Negative for congestion and sinus pain. Eyes:  Negative for blurred vision, double vision and pain. Respiratory:  Positive for shortness of breath. Negative for hemoptysis and sputum production. Cardiovascular:  Negative for chest pain, palpitations and leg swelling. Gastrointestinal:  Negative for abdominal pain, nausea and vomiting. Genitourinary:  Negative for dysuria, frequency and urgency. Musculoskeletal:  Negative for back pain, joint pain and myalgias. Skin:  Negative for itching and rash. Neurological:  Negative for tremors, sensory change, speech change and focal weakness. Psychiatric/Behavioral:  Negative for hallucinations. The patient is not nervous/anxious.        OBJECTIVE:     Labs and Data: Reviewed 23  Medications: Reviewed 23  Imaging: Reviewed 23    General - chronically ill, sarcopenia, OOBTC  HEENT- pupils equal, EOMI, anicteric  Neuro - moves all extrem, follows basic commands, no focal deficit  CV - RRR, systolic murmur  Resp - rales b/l, NC  Abd - soft, nontender, no guarding  MSK- intact, no sacral ulcer      Visit Vitals  BP 91/73 (BP 1 Location: Left upper arm, BP Patient Position: At rest)   Pulse 82   Temp 97.4 °F (36.3 °C)   Resp 19   Ht 5' 6\" (1.676 m) Comment: per wife   Wt 55.3 kg (121 lb 14.6 oz)   SpO2 (!) 62%   BMI 19.68 kg/m²    O2 Flow Rate (L/min): 2 l/min O2 Device: Nasal cannula Temp (24hrs), Av.5 °F (36.4 °C), Min:96.8 °F (36 °C), Max:98.3 °F (36.8 °C)    CVP (mmHg): 8 mmHg (02/08/23 1200)      Intake/Output:     Intake/Output Summary (Last 24 hours) at 2/8/2023 1302  Last data filed at 2/8/2023 1100  Gross per 24 hour   Intake 692.5 ml   Output 4150 ml   Net -3457.5 ml         Imaging    01/26/23    ECHO ADULT FOLLOW-UP OR LIMITED 01/27/2023 1/27/2023    Interpretation Summary    Left Ventricle: EF by visual approximation is 20%. Left ventricle is mildly dilated. Mitral Valve: Moderately thickened leaflet, at the anterior and posterior leaflets. Moderately calcified leaflet. Moderate regurgitation. Left Atrium: Left atrium is moderately dilated. Technical qualifiers: Echo study was technically difficult with poor endocardial visualization. Contrast used: Definity. Limited study, not all structures viewed    Signed by: Ramu Giron MD on 1/27/2023  4:39 PM       Pertinent imaging reviewed and interpreted as noted above    CRITICAL CARE DOCUMENTATION  I had a face to face encounter with the patient, reviewed and interpreted patient data including clinical events, labs, images, vital signs, I/O's, and examined patient. I have discussed the case and the plan and management of the patient's care with the consulting services, the bedside nurses and the respiratory therapist.      NOTE OF PERSONAL INVOLVEMENT IN CARE   This patient has a high probability of imminent, clinically significant deterioration, which requires the highest level of preparedness to intervene urgently. I participated in the decision-making and personally managed or directed the management of the following life and organ supporting interventions that required my frequent assessment to treat or prevent imminent deterioration. I personally spent 35 minutes of critical care time. This is time spent at this critically ill patient's bedside actively involved in patient care as well as the coordination of care.   This does not include any procedural time which has been billed separately. Walker Hoffman MD  Staff [address] [facility] never used

## 2023-05-03 NOTE — H&P PST ADULT - NSICDXPASTMEDICALHX_GEN_ALL_CORE_FT
PAST MEDICAL HISTORY:  CAD (coronary artery disease)     Calculus of ureter     HLD (hyperlipidemia)     Prostate cancer     STEMI (ST elevation myocardial infarction)

## 2023-05-03 NOTE — H&P PST ADULT - PRO ARRIVE FROM
Discussion/Summary   INR is 1.1. ..is not anticoagulated at all. Take 5 mg HS and recheck INR by BLOOD DRAW on Thursday. Blood ct is normal   kidney fn is good. Verified Results  CBC WITH AUTOMATED DIFFERENTIAL [id number] 05:00PM Isaias TORRES[address] [place] [address] [facility]     Test Name Result Flag Reference   WHITE BLOOD COUNT 9.3 K/mcL  4.2-11.0   RED CELL COUNT 4.48 mil/mcL  4.00-5.20   HEMOGLOBIN 13.6 g/dl  12.0-15.5   HEMATOCRIT 42.8 %  36.0-46.5   MEAN CORPUSCULAR VOLUME 95.5 fL  78.0-100.0   MEAN CORPUSCULAR HEMOGLOBIN 30.4 pg  26.0-34.0   MEAN CORPUSCULAR HGB CONC 31.8 g/dl L 32.0-36.5   RDW-CV 13.0 %  11.0-15.0   PLATELET COUNT 328 K/mcL  140-450   NICHOLAS% 64 %     LYM% 18 %     MON% 13 %     EOS% 3 %     BASO% 1 %     NICHOLAS ABS 6.1 K/mcL  1.8-7.7   LYM ABS 1.6 K/mcL  1.0-4.0   MON ABS 1.2 K/mcL H 0.3-0.9   EOS ABS 0.3 K/mcL  0.1-0.5   BASO ABS 0.1 K/mcL  0.0-0.3   DIFF TYPE      AUTOMATED DIFFERENTIAL   NRBC 0 /100 WBC  0   PERCENT IMMATURE GRANULOCYTES 1 %     ABSOLUTE IMMATURE GRANULOCYTES 0.1 K/mcL  0-0.2     BASIC METABOLIC PNL [id number] 06:13TK PRABHAKAR TORRES     Test Name Result Flag Reference   SODIUM 135 mmol/L  135-145   POTASSIUM 3.9 mmol/L  3.4-5.1   CHLORIDE 95 mmol/L L    CARBON DIOXIDE 30 mmol/L  21-32   ANION GAP 14 mmol/L  10-20   GLUCOSE 88 mg/dl  65-99   BUN 22 mg/dl H 6-20   CREATININE 0.80 mg/dl  0.51-0.95   GFR EST.  AMER 78     eGFR 60 - 89 mL/min/1.73m2 = Mild decrease in kidney function. GFR EST. NONAFRI AMER 68     eGFR 60 - 89 mL/min/1.73m2 = Mild decrease in kidney function.    BUN/CREATININE RATIO 28 H 7-25   CALCIUM 9.6 mg/dl  8.4-10.2   FASTING STATUS NONFASTING hrs       PROTHROMBIN TIME [id number] 05:00PM PRABHAKAR TORRES     Test Name Result Flag Reference   PROTHROMBIN TIME 11.9 sec H 9.7-11.8   INR 1.1     INR Therapeutic Range: 2.0 to 3.0 (2.5 to 3.5 recommended for recurrent thrombotic episodes and mechanical prosthetic heart valves.)       Message   CMA call pt and HH home

## 2023-05-03 NOTE — H&P PST ADULT - PROBLEM SELECTOR PLAN 1
Right Ureteroscopy, Laser Lithotripsy, Stone Extraction, Stent Placement on 5/17/23. Pending Cardiac clearance.

## 2023-05-03 NOTE — H&P PST ADULT - ASSESSMENT
DASI score: 5.72 mets   DASI activity: walks 1 mile 2-3x/week, able to climb 3 flights, grocery carries    Loose teeth or denture: no     MP 2

## 2023-05-04 NOTE — ADDENDUM
[FreeTextEntry1] : Entered by NAIMA ZAMAN, acting as scribe for Dr. Silvio Shi.\par The documentation recorded by the scribe accurately reflects the service I personally performed and the decisions made by me.

## 2023-05-04 NOTE — LETTER BODY
[FreeTextEntry1] : Raman Palomares MD\par [address]\[place] [address]\par [phone]\par \par Dear Dr. Palomares,\par \par Reason for visit: Prostate cancer. Right ureteral stone.\par \par This is a 73 year old man with prostate cancer status post radiation therapy and right ureteral stone. He was previously seen at the emergency room for right flank pain and was found to have a right ureteral stone. His CT scan demonstrated a right kidney stone. Patient returns for followup renal ultrasound. SInce he was last seen, patient reports taking Flomax for stone expulsion therapy without any difficulties or side effects. He denies any dysuria or gross hematuria. Patient denies any changes in health. The past medical history and family history and social history are unchanged. All other review of systems are negative. Patient denies any changes in medications. Medication list was reconciled.\par \par On examination, the patient is in no acute distress. He is alert and oriented follows commands. He has normal mood and affect. He is normocephalic. Oral no thrush. Neck is supple. Respirations are unlabored. His abdomen is soft and nontender. Liver is nonpalpable. Bladder is nonpalpable. No CVA tenderness. Neurologically he is grossly intact. No peripheral edema. Skin without gross abnormality. He has normal male external genitalia. Normal meatus. Bilateral testes are descended intrascrotally and normal to palpation. On rectal examination, there is normal sphincter tone. The prostate is clinically benign without focal induration or nodularity.\par \par His PSA in March 2023 was 0.03, which is within normal limits.\par \par I personally reviewed ultrasound images with the patient today and images demonstrated right moderate hydronephrosis visualized. There are two echogenic foci visualized, one in the mid pole measuring 7.0 mm, the second in the lower pole measuring 4.8 mm with twinkle artifact. Left kidney upper pole simple cyst with no vascularity measuring 1.7 x 1.6 x 1.9 cm. Both kidneys are normal in size and echogenicity. \par \par Assessment: Prostate cancer. No evidence of disease. Right ureteral stone.\par \par I counseled the patient. I reassured the patient. In terms of his prostate cancer, his recent PSA was stable. I recommended that he repeat PSA in 6 months to ensure stability. In terms of his right ureteral stone, his ultrasound today demonstrated an 8 mm right proximal ureteral stone and persistent right hydronephrosis. I recommended he undergo right ureteroscopy, laser lithotripsy, stone extraction, and stent placement. I counseled the patient regarding the procedure. The risks and benefits were discussed. Alternatives were given. I answered the patient questions. The patient will take the necessary preparations for the procedure, including preoperative labs. The patient will obtain Cardiology clearance for the surgery. The patient will continue Flomax for stone expulsion therapy. The patient will follow-up as directed and will contact me with any questions or concerns. Thank you for the opportunity to participate in the care of this patient. I'll keep you updated on his progress.\par \par Plan: Continue Flomax. Right ureteroscopy, preoperative labs, Cardiology clearance. Follow up in 6 months for PSA testing.

## 2023-05-04 NOTE — HISTORY OF PRESENT ILLNESS
[FreeTextEntry1] : Follow-up prostate cancer.  PSA 0.03.  Stable.  Repeat PSA 6 months.\par \par Follow-up right ureteral stone.  Patient has a 8 mm right proximal ureteral stone..  The demonstrated persistent right hydronephrosis.\par \par Plan: Right ureteroscopy..  Lab.  Medical clearance cardiology clearance\par \par Please refer to URO Consult note

## 2023-05-08 ENCOUNTER — NON-APPOINTMENT (OUTPATIENT)
Age: 73
End: 2023-05-08

## 2023-05-16 ENCOUNTER — TRANSCRIPTION ENCOUNTER (OUTPATIENT)
Age: 73
End: 2023-05-16

## 2023-05-17 ENCOUNTER — APPOINTMENT (OUTPATIENT)
Dept: UROLOGY | Facility: HOSPITAL | Age: 73
End: 2023-05-17

## 2023-05-17 ENCOUNTER — TRANSCRIPTION ENCOUNTER (OUTPATIENT)
Age: 73
End: 2023-05-17

## 2023-05-17 ENCOUNTER — RESULT REVIEW (OUTPATIENT)
Age: 73
End: 2023-05-17

## 2023-05-17 ENCOUNTER — OUTPATIENT (OUTPATIENT)
Dept: OUTPATIENT SERVICES | Facility: HOSPITAL | Age: 73
LOS: 1 days | End: 2023-05-17
Payer: MEDICARE

## 2023-05-17 VITALS
DIASTOLIC BLOOD PRESSURE: 79 MMHG | OXYGEN SATURATION: 99 % | RESPIRATION RATE: 16 BRPM | SYSTOLIC BLOOD PRESSURE: 174 MMHG | HEART RATE: 61 BPM

## 2023-05-17 VITALS
HEART RATE: 59 BPM | HEIGHT: 68 IN | WEIGHT: 164.02 LBS | RESPIRATION RATE: 18 BRPM | DIASTOLIC BLOOD PRESSURE: 62 MMHG | SYSTOLIC BLOOD PRESSURE: 116 MMHG | OXYGEN SATURATION: 100 % | TEMPERATURE: 97 F

## 2023-05-17 DIAGNOSIS — Z90.49 ACQUIRED ABSENCE OF OTHER SPECIFIED PARTS OF DIGESTIVE TRACT: Chronic | ICD-10-CM

## 2023-05-17 DIAGNOSIS — N20.1 CALCULUS OF URETER: ICD-10-CM

## 2023-05-17 DIAGNOSIS — Z95.5 PRESENCE OF CORONARY ANGIOPLASTY IMPLANT AND GRAFT: Chronic | ICD-10-CM

## 2023-05-17 PROCEDURE — 76000 FLUOROSCOPY <1 HR PHYS/QHP: CPT

## 2023-05-17 PROCEDURE — C1758: CPT

## 2023-05-17 PROCEDURE — 52356 CYSTO/URETERO W/LITHOTRIPSY: CPT | Mod: RT

## 2023-05-17 PROCEDURE — 74420 UROGRAPHY RTRGR +-KUB: CPT | Mod: 26

## 2023-05-17 PROCEDURE — 88300 SURGICAL PATH GROSS: CPT

## 2023-05-17 PROCEDURE — C1889: CPT

## 2023-05-17 PROCEDURE — C1769: CPT

## 2023-05-17 PROCEDURE — 82365 CALCULUS SPECTROSCOPY: CPT

## 2023-05-17 PROCEDURE — 88300 SURGICAL PATH GROSS: CPT | Mod: 26

## 2023-05-17 PROCEDURE — C2617: CPT

## 2023-05-17 DEVICE — GUIDEWIRE SENSOR DUAL-FLEX NITINOL STRAIGHT .035" X 150CM: Type: IMPLANTABLE DEVICE | Site: RIGHT | Status: FUNCTIONAL

## 2023-05-17 DEVICE — LASER FIBER SOLTIVE 200 BALL TIP: Type: IMPLANTABLE DEVICE | Site: RIGHT | Status: FUNCTIONAL

## 2023-05-17 DEVICE — URETERAL SHEATH NAVIGATOR 11/13FR X 28CM: Type: IMPLANTABLE DEVICE | Site: RIGHT | Status: FUNCTIONAL

## 2023-05-17 DEVICE — STONE BASKET ZEROTIP NITINOL 4-WIRE 1.9FR 120CM X 12MM: Type: IMPLANTABLE DEVICE | Site: RIGHT | Status: FUNCTIONAL

## 2023-05-17 DEVICE — KIT URET PERFLX PLUS 6FRX26CM: Type: IMPLANTABLE DEVICE | Site: RIGHT | Status: FUNCTIONAL

## 2023-05-17 DEVICE — IMPLANTABLE DEVICE: Type: IMPLANTABLE DEVICE | Site: RIGHT | Status: FUNCTIONAL

## 2023-05-17 DEVICE — URETERAL CATH OPEN END 5FR 70CM: Type: IMPLANTABLE DEVICE | Site: RIGHT | Status: FUNCTIONAL

## 2023-05-17 RX ORDER — CEPHALEXIN 500 MG
1 CAPSULE ORAL
Qty: 6 | Refills: 0
Start: 2023-05-17 | End: 2023-05-19

## 2023-05-17 RX ORDER — ATORVASTATIN CALCIUM 80 MG/1
1 TABLET, FILM COATED ORAL
Refills: 0 | DISCHARGE

## 2023-05-17 RX ORDER — SODIUM CHLORIDE 9 MG/ML
1000 INJECTION, SOLUTION INTRAVENOUS
Refills: 0 | Status: DISCONTINUED | OUTPATIENT
Start: 2023-05-17 | End: 2023-05-31

## 2023-05-17 RX ORDER — HYDROMORPHONE HYDROCHLORIDE 2 MG/ML
0.5 INJECTION INTRAMUSCULAR; INTRAVENOUS; SUBCUTANEOUS
Refills: 0 | Status: DISCONTINUED | OUTPATIENT
Start: 2023-05-17 | End: 2023-05-17

## 2023-05-17 RX ORDER — HYDROMORPHONE HYDROCHLORIDE 2 MG/ML
1 INJECTION INTRAMUSCULAR; INTRAVENOUS; SUBCUTANEOUS
Refills: 0 | Status: DISCONTINUED | OUTPATIENT
Start: 2023-05-17 | End: 2023-05-17

## 2023-05-17 RX ORDER — LIDOCAINE HCL 20 MG/ML
0.2 VIAL (ML) INJECTION ONCE
Refills: 0 | Status: COMPLETED | OUTPATIENT
Start: 2023-05-17 | End: 2023-05-17

## 2023-05-17 RX ORDER — CEFAZOLIN SODIUM 1 G
2000 VIAL (EA) INJECTION ONCE
Refills: 0 | Status: COMPLETED | OUTPATIENT
Start: 2023-05-17 | End: 2023-05-17

## 2023-05-17 RX ORDER — ONDANSETRON 8 MG/1
8 TABLET, FILM COATED ORAL ONCE
Refills: 0 | Status: DISCONTINUED | OUTPATIENT
Start: 2023-05-17 | End: 2023-05-31

## 2023-05-17 RX ADMIN — SODIUM CHLORIDE 100 MILLILITER(S): 9 INJECTION, SOLUTION INTRAVENOUS at 10:33

## 2023-05-17 RX ADMIN — SODIUM CHLORIDE 3 MILLILITER(S): 9 INJECTION INTRAMUSCULAR; INTRAVENOUS; SUBCUTANEOUS at 10:38

## 2023-05-17 RX ADMIN — SODIUM CHLORIDE 100 MILLILITER(S): 9 INJECTION, SOLUTION INTRAVENOUS at 14:02

## 2023-05-17 NOTE — ASU DISCHARGE PLAN (ADULT/PEDIATRIC) - NS MD DC FALL RISK RISK
For information on Fall & Injury Prevention, visit: [email or web address] OR  [email or web address] OR  https://www.cdc.gov/steadi/patient.html

## 2023-05-17 NOTE — H&P PST ADULT - TOBACCO USE
Never smoker Medical Necessity Information: LCD Guidelines vary from payer to payer. Please check with your payer's policy to determine medical necessity.

## 2023-05-17 NOTE — ASU PATIENT PROFILE, ADULT - FALL HARM RISK - UNIVERSAL INTERVENTIONS
Bed in lowest position, wheels locked, appropriate side rails in place/Call bell, personal items and telephone in reach/Instruct patient to call for assistance before getting out of bed or chair/Non-slip footwear when patient is out of bed/[place] to call system/Physically safe environment - no spills, clutter or unnecessary equipment/Purposeful Proactive Rounding/Room/bathroom lighting operational, light cord in reach

## 2023-05-17 NOTE — ASU DISCHARGE PLAN (ADULT/PEDIATRIC) - ASU DC SPECIAL INSTRUCTIONSFT
Please follow up with Dr. Shi in 2 weeks.  Call [phone] to schedule/confirm your appointment.  Call or follow up sooner with fevers, chills, nausea, vomiting, increasing pain, or with other concerns.

## 2023-06-02 PROBLEM — C61 MALIGNANT NEOPLASM OF PROSTATE: Chronic | Status: ACTIVE | Noted: 2023-05-03

## 2023-06-02 PROBLEM — N20.1 CALCULUS OF URETER: Chronic | Status: ACTIVE | Noted: 2023-05-03

## 2023-06-02 PROBLEM — E78.5 HYPERLIPIDEMIA, UNSPECIFIED: Chronic | Status: ACTIVE | Noted: 2023-05-03

## 2023-06-02 PROBLEM — I25.10 ATHEROSCLEROTIC HEART DISEASE OF NATIVE CORONARY ARTERY WITHOUT ANGINA PECTORIS: Chronic | Status: ACTIVE | Noted: 2023-05-03

## 2023-06-02 PROBLEM — I21.3 ST ELEVATION (STEMI) MYOCARDIAL INFARCTION OF UNSPECIFIED SITE: Chronic | Status: ACTIVE | Noted: 2023-05-03

## 2023-06-02 LAB
CELL MATERIAL STONE EST-MCNT: SIGNIFICANT CHANGE UP
LABORATORY COMMENT REPORT: SIGNIFICANT CHANGE UP
NIDUS STONE QN: SIGNIFICANT CHANGE UP

## 2023-06-05 ENCOUNTER — OUTPATIENT (OUTPATIENT)
Dept: OUTPATIENT SERVICES | Facility: HOSPITAL | Age: 73
LOS: 1 days | End: 2023-06-05
Payer: MEDICARE

## 2023-06-05 ENCOUNTER — APPOINTMENT (OUTPATIENT)
Dept: UROLOGY | Facility: CLINIC | Age: 73
End: 2023-06-05
Payer: MEDICARE

## 2023-06-05 VITALS
TEMPERATURE: 98.3 F | RESPIRATION RATE: 16 BRPM | DIASTOLIC BLOOD PRESSURE: 74 MMHG | HEART RATE: 69 BPM | SYSTOLIC BLOOD PRESSURE: 125 MMHG

## 2023-06-05 DIAGNOSIS — Z95.5 PRESENCE OF CORONARY ANGIOPLASTY IMPLANT AND GRAFT: Chronic | ICD-10-CM

## 2023-06-05 DIAGNOSIS — C61 MALIGNANT NEOPLASM OF PROSTATE: ICD-10-CM

## 2023-06-05 DIAGNOSIS — R97.20 ELEVATED PROSTATE, SPECIFIC ANTIGEN [PSA]: ICD-10-CM

## 2023-06-05 DIAGNOSIS — R35.0 FREQUENCY OF MICTURITION: ICD-10-CM

## 2023-06-05 DIAGNOSIS — Z00.00 ENCOUNTER FOR GENERAL ADULT MEDICAL EXAMINATION W/OUT ABNORMAL FINDINGS: ICD-10-CM

## 2023-06-05 PROCEDURE — 52310 CYSTOSCOPY AND TREATMENT: CPT

## 2023-06-07 DIAGNOSIS — I25.10 ATHEROSCLEROTIC HEART DISEASE OF NATIVE CORONARY ARTERY WITHOUT ANGINA PECTORIS: ICD-10-CM

## 2023-06-07 DIAGNOSIS — N20.1 CALCULUS OF URETER: ICD-10-CM

## 2023-06-07 DIAGNOSIS — C61 MALIGNANT NEOPLASM OF PROSTATE: ICD-10-CM

## 2023-06-07 DIAGNOSIS — Z92.29 PERSONAL HISTORY OF OTHER DRUG THERAPY: ICD-10-CM

## 2023-06-07 DIAGNOSIS — R97.20 ELEVATED PROSTATE SPECIFIC ANTIGEN [PSA]: ICD-10-CM

## 2023-06-07 DIAGNOSIS — Z00.00 ENCOUNTER FOR GENERAL ADULT MEDICAL EXAMINATION WITHOUT ABNORMAL FINDINGS: ICD-10-CM

## 2023-07-03 LAB — SURGICAL PATHOLOGY STUDY: SIGNIFICANT CHANGE UP

## 2023-07-23 ENCOUNTER — NON-APPOINTMENT (OUTPATIENT)
Age: 73
End: 2023-07-23

## 2023-07-24 ENCOUNTER — APPOINTMENT (OUTPATIENT)
Dept: UROLOGY | Facility: CLINIC | Age: 73
End: 2023-07-24
Payer: MEDICARE

## 2023-07-24 ENCOUNTER — OUTPATIENT (OUTPATIENT)
Dept: OUTPATIENT SERVICES | Facility: HOSPITAL | Age: 73
LOS: 1 days | End: 2023-07-24
Payer: MEDICARE

## 2023-07-24 DIAGNOSIS — N20.1 CALCULUS OF URETER: ICD-10-CM

## 2023-07-24 DIAGNOSIS — Z95.5 PRESENCE OF CORONARY ANGIOPLASTY IMPLANT AND GRAFT: Chronic | ICD-10-CM

## 2023-07-24 DIAGNOSIS — Z90.49 ACQUIRED ABSENCE OF OTHER SPECIFIED PARTS OF DIGESTIVE TRACT: Chronic | ICD-10-CM

## 2023-07-24 DIAGNOSIS — R35.0 FREQUENCY OF MICTURITION: ICD-10-CM

## 2023-07-24 PROCEDURE — 76775 US EXAM ABDO BACK WALL LIM: CPT | Mod: 26

## 2023-07-24 PROCEDURE — 99214 OFFICE O/P EST MOD 30 MIN: CPT | Mod: 25

## 2023-07-24 PROCEDURE — 76775 US EXAM ABDO BACK WALL LIM: CPT

## 2023-08-02 DIAGNOSIS — N20.1 CALCULUS OF URETER: ICD-10-CM

## 2023-08-06 NOTE — ADDENDUM
[FreeTextEntry1] : Entered by Angelina Clemens, acting as scribe for Dr. Silvio Shi.\par The documentation recorded by the scribe accurately reflects the service I personally performed and the decisions made by me.

## 2023-08-06 NOTE — HISTORY OF PRESENT ILLNESS
[FreeTextEntry1] : Please refer to URO Consult note \par \par followup prostate cancer\par \par In terms of prostate cancer, his last PSA was 0.03 in March 2023.  Repeat PSA in 6 months.\par \par followup kidney stone \par Patient underwent previous right ureteroscopy.  Chemical analysis demonstrated calcium oxalate stone.  Patient with renal ultrasound today.\par \par I personally reviewed the ultrasound scan with patient today. Ultrasound demonstrated stable left renal cyst.  There is no hydronephrosis.\par \par I reassured patient.  Ultrasound in 6 months.  I encouraged patient to maintain a low-protein low-sodium diet. I also encouraged hydration to prevent stone formation.\par

## 2023-08-06 NOTE — LETTER BODY
[FreeTextEntry1] : Rmaan Palomares -[address] [place], [place] [id number] [phone]  Dear Dr. Palomares, /Reason for visit: Prostate cancer. Previous right ueteral stone.  This is a 73 year old man with prostate cancer status post radiation therapy and right ureteral stone. He was previously seen at the emergency room for right flank pain and was found to have a right ureteral stone. His CT scan demonstrated a right kidney stone. patient went previous right ureteroscopy for stone extraction.  He is status post previous stent removal. Patient returns for followup renal ultrasound. He denies any dysuria or gross hematuria. Patient denies any changes in health. The past medical history and family history and social history are unchanged. All other review of systems are negative. Patient denies any changes in medications. Medication list was reconciled  On examination, the patient is in no acute distress. He is alert and oriented follows commands. He has normal mood and affect. He is normocephalic. Oral no thrush. Neck is supple. Respirations are unlabored. His abdomen is soft and nontender. Liver is nonpalpable. Bladder is nonpalpable. No CVA tenderness. Neurologically he is grossly intact. No peripheral edema. Skin without gross abnormality. He has normal male external genitalia. Normal meatus. Bilateral testes are descended intrascrotally and normal to palpation. On rectal examination, there is normal sphincter tone. The prostate is clinically benign without focal induration or nodularity.  I personally reviewed the ultrasound scan with patient today. Ultrasound demonstrated no evidence of obstructing renal calculi.  No hydronephrosis.  His PSA in March 2023 was 0.03, which is within normal limits.  Assessment: Prostate cancer. No evidence of disease. Right ureteral stone,  Resolved.  I counseled the patient. I reassured the patient. In terms of his prostate cancer, his recent PSA was stable. I recommended that he repeat PSA in 6 months to ensure stability. In terms of his right ureteral stone, his ultrasound today demonstrated  resolution of the right ureteral stone.   I encouraged patient to maintain a low-protein low-sodium diet. I also encouraged hydration to prevent stone formation.    Risks and alternatives were discussed. I answered the patient questions. The patient will follow-up as directed and will contact me with any questions or concerns.   Thank you for the opportunity to participate in the care of this patient. I'll keep you updated on his  progress.    Plan: Follow-up 6 months for renal ultrasound PSA testing.

## 2023-09-18 NOTE — PATIENT PROFILE ADULT. - PATIENT REPRESENTATIVE PHONE
[phone] Winlevi Counseling:  I discussed with the patient the risks of topical clascoterone including but not limited to erythema, scaling, itching, and stinging. Patient voiced their understanding.

## 2023-09-19 ENCOUNTER — APPOINTMENT (OUTPATIENT)
Dept: RADIATION ONCOLOGY | Facility: CLINIC | Age: 73
End: 2023-09-19
Payer: MEDICARE

## 2023-09-19 VITALS
SYSTOLIC BLOOD PRESSURE: 131 MMHG | WEIGHT: 166 LBS | OXYGEN SATURATION: 98 % | HEART RATE: 54 BPM | HEIGHT: 68 IN | RESPIRATION RATE: 17 BRPM | TEMPERATURE: 96.5 F | DIASTOLIC BLOOD PRESSURE: 77 MMHG | BODY MASS INDEX: 25.16 KG/M2

## 2023-09-19 PROCEDURE — 99212 OFFICE O/P EST SF 10 MIN: CPT | Mod: GC

## 2023-09-19 RX ORDER — TAMSULOSIN HYDROCHLORIDE 0.4 MG/1
0.4 CAPSULE ORAL
Qty: 90 | Refills: 3 | Status: DISCONTINUED | COMMUNITY
Start: 2023-03-09 | End: 2023-09-19

## 2023-09-25 LAB — PSA SERPL-MCNC: 0.02 NG/ML

## 2023-09-28 ENCOUNTER — APPOINTMENT (OUTPATIENT)
Dept: UROLOGY | Facility: CLINIC | Age: 73
End: 2023-09-28

## 2023-09-29 ENCOUNTER — APPOINTMENT (OUTPATIENT)
Dept: CV DIAGNOSITCS | Facility: HOSPITAL | Age: 73
End: 2023-09-29
Payer: MEDICARE

## 2023-09-29 ENCOUNTER — OUTPATIENT (OUTPATIENT)
Dept: OUTPATIENT SERVICES | Facility: HOSPITAL | Age: 73
LOS: 1 days | End: 2023-09-29

## 2023-09-29 ENCOUNTER — NON-APPOINTMENT (OUTPATIENT)
Age: 73
End: 2023-09-29

## 2023-09-29 DIAGNOSIS — I25.5 ISCHEMIC CARDIOMYOPATHY: ICD-10-CM

## 2023-09-29 DIAGNOSIS — Z90.49 ACQUIRED ABSENCE OF OTHER SPECIFIED PARTS OF DIGESTIVE TRACT: Chronic | ICD-10-CM

## 2023-09-29 DIAGNOSIS — Z95.5 PRESENCE OF CORONARY ANGIOPLASTY IMPLANT AND GRAFT: Chronic | ICD-10-CM

## 2023-09-29 PROCEDURE — 93356 MYOCRD STRAIN IMG SPCKL TRCK: CPT

## 2023-09-29 PROCEDURE — 93306 TTE W/DOPPLER COMPLETE: CPT | Mod: 26

## 2023-10-01 PROBLEM — Z92.3 HISTORY OF RADIATION THERAPY: Status: RESOLVED | Noted: 2022-06-15 | Resolved: 2023-10-01

## 2023-10-04 ENCOUNTER — NON-APPOINTMENT (OUTPATIENT)
Age: 73
End: 2023-10-04

## 2023-10-04 ENCOUNTER — APPOINTMENT (OUTPATIENT)
Dept: CARDIOLOGY | Facility: CLINIC | Age: 73
End: 2023-10-04
Payer: MEDICARE

## 2023-10-04 VITALS
OXYGEN SATURATION: 95 % | HEIGHT: 68 IN | SYSTOLIC BLOOD PRESSURE: 122 MMHG | DIASTOLIC BLOOD PRESSURE: 78 MMHG | HEART RATE: 61 BPM

## 2023-10-04 DIAGNOSIS — Z92.29 PERSONAL HISTORY OF OTHER DRUG THERAPY: ICD-10-CM

## 2023-10-04 PROCEDURE — 93000 ELECTROCARDIOGRAM COMPLETE: CPT

## 2023-10-04 PROCEDURE — 99213 OFFICE O/P EST LOW 20 MIN: CPT

## 2023-12-11 RX ORDER — METOPROLOL TARTRATE 25 MG/1
25 TABLET, FILM COATED ORAL
Qty: 90 | Refills: 3 | Status: ACTIVE | COMMUNITY
Start: 2019-01-23 | End: 1900-01-01

## 2023-12-23 ENCOUNTER — RX RENEWAL (OUTPATIENT)
Age: 73
End: 2023-12-23

## 2023-12-23 RX ORDER — ATORVASTATIN CALCIUM 40 MG/1
40 TABLET, FILM COATED ORAL
Qty: 90 | Refills: 3 | Status: ACTIVE | COMMUNITY
Start: 2019-12-13 | End: 1900-01-01

## 2024-01-22 ENCOUNTER — LABORATORY RESULT (OUTPATIENT)
Age: 74
End: 2024-01-22

## 2024-01-22 LAB — PSA SERPL-MCNC: 0.02 NG/ML

## 2024-01-25 ENCOUNTER — APPOINTMENT (OUTPATIENT)
Dept: UROLOGY | Facility: CLINIC | Age: 74
End: 2024-01-25
Payer: MEDICARE

## 2024-01-25 VITALS
DIASTOLIC BLOOD PRESSURE: 72 MMHG | OXYGEN SATURATION: 98 % | SYSTOLIC BLOOD PRESSURE: 137 MMHG | RESPIRATION RATE: 16 BRPM | TEMPERATURE: 97.3 F | HEART RATE: 51 BPM

## 2024-01-25 PROCEDURE — 99213 OFFICE O/P EST LOW 20 MIN: CPT

## 2024-01-25 PROCEDURE — G2211 COMPLEX E/M VISIT ADD ON: CPT

## 2024-01-25 NOTE — LETTER BODY
[FreeTextEntry1] : Raman Palomares -[address] [place], [place] [id number] [phone]  Dear Dr. Palomares,  /Reason for visit: Prostate cancer. Previous right ureteral stone.  This is a 74 year old man with prostate cancer status post radiation therapy and right ureteral stone. He was previously seen at the emergency room for right flank pain and was found to have a right ureteral stone. His CT scan demonstrated a right kidney stone. patient went previous right ureteroscopy for stone extraction. He is status post previous stent removal. Patient returns for followup. He denies any dysuria or gross hematuria. Patient denies any changes in health. The past medical history and family history and social history are unchanged. All other review of systems are negative. Patient denies any changes in medications. Medication list was reconciled  On examination, the patient is in no acute distress. He is alert and oriented follows commands. He has normal mood and affect. He is normocephalic. Oral no thrush. Neck is supple. Respirations are unlabored. His abdomen is soft and nontender. Liver is nonpalpable. Bladder is nonpalpable. No CVA tenderness. Neurologically he is grossly intact. No peripheral edema. Skin without gross abnormality.     His PSA was 0.02, which is within normal limits.  Assessment: Prostate cancer, no evidence of recurrent disease. Stable PSA.  I counseled the patient. I reassured the patient. In terms of his prostate cancer, his recent PSA was stable. I recommended that he repeat PSA in 1 years to ensure stability. Risks and alternatives were discussed. I answered the patient questions. The patient will follow-up as directed and will contact me with any questions or concerns. Thank you for the opportunity to participate in the care of this patient. I'll keep you updated on his progress.   Plan follow-up 1 year for PSA testing.

## 2024-01-25 NOTE — HISTORY OF PRESENT ILLNESS
[FreeTextEntry1] : Follow-up prostate cancer.  Patient history of prostate cancer status post ration therapy.  He has been doing well denies any flank pain.  Patient's PSA was 0.02.  Assessment: Prostate cancer, no evidence of recurrent disease.  Stable PSA.  I reassured the patient.  Encouraged follow-up in 1 years time.  Plan follow-up 1 year for PSA testing.  Please refer to URO Consult note

## 2024-01-25 NOTE — ADDENDUM
[FreeTextEntry1] : Entered by Angelina Clemens, acting as scribe for Dr. Silvio Shi. The documentation recorded by the scribe accurately reflects the service I personally performed and the decisions made by me.

## 2024-04-10 ENCOUNTER — APPOINTMENT (OUTPATIENT)
Dept: CARDIOLOGY | Facility: CLINIC | Age: 74
End: 2024-04-10
Payer: MEDICARE

## 2024-04-10 ENCOUNTER — NON-APPOINTMENT (OUTPATIENT)
Age: 74
End: 2024-04-10

## 2024-04-10 VITALS
DIASTOLIC BLOOD PRESSURE: 74 MMHG | WEIGHT: 165 LBS | BODY MASS INDEX: 25.01 KG/M2 | SYSTOLIC BLOOD PRESSURE: 120 MMHG | OXYGEN SATURATION: 97 % | HEART RATE: 56 BPM | HEIGHT: 68 IN

## 2024-04-10 DIAGNOSIS — I25.10 ATHEROSCLEROTIC HEART DISEASE OF NATIVE CORONARY ARTERY W/OUT ANGINA PECTORIS: ICD-10-CM

## 2024-04-10 DIAGNOSIS — I25.5 ISCHEMIC CARDIOMYOPATHY: ICD-10-CM

## 2024-04-10 PROCEDURE — 99213 OFFICE O/P EST LOW 20 MIN: CPT

## 2024-04-10 PROCEDURE — 93000 ELECTROCARDIOGRAM COMPLETE: CPT

## 2024-04-10 NOTE — PHYSICAL EXAM
[Well Developed] : well developed [Well Nourished] : well nourished [No Acute Distress] : no acute distress [Normal Conjunctiva] : normal conjunctiva [Normal Venous Pressure] : normal venous pressure [No Carotid Bruit] : no carotid bruit [No Murmur] : no murmur [No Rub] : no rub [No Gallop] : no gallop [Clear Lung Fields] : clear lung fields [Good Air Entry] : good air entry [No Respiratory Distress] : no respiratory distress  [Soft] : abdomen soft [Non Tender] : non-tender [Normal Gait] : normal gait [No Edema] : no edema [No Cyanosis] : no cyanosis [No Rash] : no rash [No Skin Lesions] : no skin lesions [Moves all extremities] : moves all extremities [No Focal Deficits] : no focal deficits [Normal Speech] : normal speech [Alert and Oriented] : alert and oriented [de-identified] : bradycardia

## 2024-04-10 NOTE — DISCUSSION/SUMMARY
[Cardiomyopathy] : cardiomyopathy [Coronary Artery Disease] : coronary artery disease [Stable] : stable [FreeTextEntry1] : Currently stable from a cardiovascular standpoint. Normotensive. History of ischemic cardiomyopathy with interval improvement in LV systolic function (LVEF 45% -> 65%). Currently euvolemic. Stable CAD (s/p distal RCA stent). Asymptomatic. Patient with prostate cancer treated with radiation (26 treatments). Continue current medications including aspirin and statin. ECG completed today and reviewed (findings as noted above). Follow up in 6 months. [EKG obtained to assist in diagnosis and management of assessed problem(s)] : EKG obtained to assist in diagnosis and management of assessed problem(s)

## 2024-04-10 NOTE — CARDIOLOGY SUMMARY
[de-identified] : 04/10/24 - sinus bradycardia, 54 bpm, low voltage in limb leads [de-identified] : 09/29/23 - normal LA, normal LV and RV size and function, PASP 18 mmHg, LVEF 65% 10/29/17 - normal LA, mild segmental LV systolic dysfunction, normal RV size and function, LVEF 45% [de-identified] : 10/29/17 (PCI) - RESOLUTE MELA stent to dRCA 99% 10/29/17 (CATH) - pRCA 20%, dRCA 99%, LVEF 50%

## 2024-06-14 LAB — PSA SERPL-MCNC: 0.02 NG/ML

## 2024-06-18 ENCOUNTER — APPOINTMENT (OUTPATIENT)
Dept: RADIATION ONCOLOGY | Facility: CLINIC | Age: 74
End: 2024-06-18
Payer: MEDICARE

## 2024-06-18 VITALS
HEIGHT: 68 IN | HEART RATE: 56 BPM | RESPIRATION RATE: 16 BRPM | TEMPERATURE: 96.98 F | OXYGEN SATURATION: 96 % | WEIGHT: 168.32 LBS | BODY MASS INDEX: 25.51 KG/M2 | SYSTOLIC BLOOD PRESSURE: 117 MMHG | DIASTOLIC BLOOD PRESSURE: 75 MMHG

## 2024-06-18 PROCEDURE — G2211 COMPLEX E/M VISIT ADD ON: CPT

## 2024-06-18 PROCEDURE — 99213 OFFICE O/P EST LOW 20 MIN: CPT

## 2024-06-18 NOTE — DISEASE MANAGEMENT
[0-10] : 0 -10 ng/mL [Biopsy with Fusion] : Patient had a biopsy with fusion on [7(3+4)] : Fusion Biopsy Sulphur Score: 7(3+4) [] : Patient had a Prostate MRI [5] : 5 [IIIA] : IIIA [Radiation Therapy] : Radiation Therapy [Treatment with radiation therapy] : Treatment with radiation therapy [EBRT] : EBRT [Treatment with androgen ablation] : Treatment with androgen ablation [Ultrasound] : TNM Stage: u [BiopsyDate] : 1/28/2021 [MeasuredProstateVolume] : 16 [TotalCores] : 15 [TotalPositiveCores] : 8 [MaxCoreInvolvement] : 80 [BoneScanResults] : No scan evidence of osseous metastasis. [RadiationCompletedDate] : 5/10/2021 [EBRTDose] : 7020cGy [EBRTFractions] : 26 [ADTStartedDate] : 3/2021 [ADTDuration] : 12 months [ADTCompletedDate] : last shot 9/2021 [TTNM] : 3a [NTNM] : 0 [MTNM] : 0

## 2024-06-18 NOTE — HISTORY OF PRESENT ILLNESS
[FreeTextEntry1] : Mr. Barry is a 74-year-old male who is being seen for follow up visit.  He is unaccompanied for today's visit.     Diagnosis:  rT3a N0 M0, Prostate adenocarcinoma, Davis 3+4=7, 8/15 cores involved, up to 80% involvement, T3a - bulging of the capsule bilaterally and extension into the right neurovascular bundle on the right.   PSA 7.99 NG/ML on 9/9/20.    RADIATION Treatment:  Treatment Dates: 4/5/2021 - 5/10/2021 Prostate/SV/Nodes IMRT, 7,020 cGy in 26 fx  with 12 months ADT, last Eligard 9/2021 (Dr. Shi - urology) CLINICAL COURSE:  Tolerated his radiation well without significant acute side effects.    PSA Trend: 3/18/19 - 6.30 ng/mL 11/6/19 - 6.57 9/9/20 - 7.99 6/7/21 - < 0.01  9/7/21 - < 0.01  1/4/22 - <0.01 6/13/22 - < 0.01 3/9/2023 - 0.03 ng/mL 9/11/23 - 0.02 1/22/24 - 0.02 6/13/24 - 0.02   6/17/22 - presented for follow up visit.  Mr. Barry is feeling well today and has no complaints.  Denies any bowel issues and urinary symptoms are stable.  He continues to follow with Dr. Shi (urology).  IPSS 3 / EPIC 0    Follow up in March 2023.   3/17/23 - Patient presents for follow up visit.   PSA obtained 3/9/2023 *0.03 ng/mL. IPSS:2 EPIC:2 Patient reports feeling well overall. Denies bowel changes and delia urinary symptoms. 18m follow up. Clinically extremely well. Had a recent kidney stone, is back on Flomax for this. Follows with Dr Shi. FU 6m with PSA.  9/19/23 - presented for follow up visit. Continues to follow with Dr. Shi (urology), last seen 7/24/23.  Bowel function: normal Erectile function: normal Urinary function: normal     IPSS/QOL/EPIC: 3/1/0 24m follow up today. Clinically extremely well. PSA 0.02ng/mL. Seeing Dr Shi in January, FU with me in June 2024.  6/18/24 - presents for follow up visit.  Mr. Barry is feeling well today.  He has minimal urinary complaints, nocturia 1x, occasional dribbling, and some frequency.  No hematuria or dysuria.  No bowel issues.  IPSS 2 / QOL 1 / EPIC-CP 4 He continues to follow with Dr. Shi (urology) last seen 1/2024 with next visit 1/2025.

## 2024-06-18 NOTE — REVIEW OF SYSTEMS
[Nocturia] : nocturia [IPSS Score (0-40): ___] : IPSS score: [unfilled] [EPIC-CP Score (0-60): ___] : EPIC-CP score: [unfilled] [Negative] : Psychiatric [Constipation: Grade 0] : Constipation: Grade 0 [Diarrhea: Grade 0] : Diarrhea: Grade 0 [Hematuria: Grade 0] : Hematuria: Grade 0 [Urinary Incontinence: Grade 1 - Occasional (e.g., with coughing, sneezing, etc.), pads not indicated] : Urinary Incontinence: Grade 1 - Occasional (e.g., with coughing, sneezing, etc.), pads not indicated [Urinary Retention: Grade 0] : Urinary Retention: Grade 0 [Urinary Tract Pain: Grade 0] : Urinary Tract Pain: Grade 0 [Urinary Urgency: Grade 0] : Urinary Urgency: Grade 0 [Urinary Frequency: Grade 1 - Present] : Urinary Frequency: Grade 1 - Present [Ejaculation Disorder: Grade 0] : Ejaculation Disorder: Grade 0 [Erectile Dysfunction: Grade 0] : Erectile Dysfunction: Grade 0 [FreeTextEntry2] : some tiredness  [FreeTextEntry8] : QOL 1  [de-identified] : chest/breast tenderness at times  [FreeTextEntry9] : nocturia 1x

## 2024-10-07 ENCOUNTER — RX RENEWAL (OUTPATIENT)
Age: 74
End: 2024-10-07

## 2024-10-09 ENCOUNTER — NON-APPOINTMENT (OUTPATIENT)
Age: 74
End: 2024-10-09

## 2024-10-09 ENCOUNTER — APPOINTMENT (OUTPATIENT)
Dept: CARDIOLOGY | Facility: CLINIC | Age: 74
End: 2024-10-09
Payer: MEDICARE

## 2024-10-09 VITALS
BODY MASS INDEX: 25.91 KG/M2 | HEIGHT: 68 IN | OXYGEN SATURATION: 97 % | SYSTOLIC BLOOD PRESSURE: 117 MMHG | WEIGHT: 171 LBS | DIASTOLIC BLOOD PRESSURE: 73 MMHG | RESPIRATION RATE: 14 BRPM | HEART RATE: 55 BPM

## 2024-10-09 DIAGNOSIS — I25.5 ISCHEMIC CARDIOMYOPATHY: ICD-10-CM

## 2024-10-09 DIAGNOSIS — I25.10 ATHEROSCLEROTIC HEART DISEASE OF NATIVE CORONARY ARTERY W/OUT ANGINA PECTORIS: ICD-10-CM

## 2024-10-09 PROCEDURE — 93000 ELECTROCARDIOGRAM COMPLETE: CPT

## 2024-10-09 PROCEDURE — 99213 OFFICE O/P EST LOW 20 MIN: CPT

## 2025-01-24 LAB
ANION GAP SERPL CALC-SCNC: 11 MMOL/L
BUN SERPL-MCNC: 28 MG/DL
CALCIUM SERPL-MCNC: 9.1 MG/DL
CHLORIDE SERPL-SCNC: 108 MMOL/L
CO2 SERPL-SCNC: 24 MMOL/L
CREAT SERPL-MCNC: 1.49 MG/DL
EGFR: 48 ML/MIN/1.73M2
GLUCOSE SERPL-MCNC: 74 MG/DL
POTASSIUM SERPL-SCNC: 4.1 MMOL/L
PSA FREE FLD-MCNC: NORMAL %
PSA FREE SERPL-MCNC: <0.01 NG/ML
PSA SERPL-MCNC: 0.01 NG/ML
SODIUM SERPL-SCNC: 144 MMOL/L

## 2025-01-27 ENCOUNTER — APPOINTMENT (OUTPATIENT)
Dept: UROLOGY | Facility: CLINIC | Age: 75
End: 2025-01-27
Payer: MEDICARE

## 2025-01-27 VITALS — DIASTOLIC BLOOD PRESSURE: 84 MMHG | SYSTOLIC BLOOD PRESSURE: 145 MMHG

## 2025-01-27 PROCEDURE — 99213 OFFICE O/P EST LOW 20 MIN: CPT

## 2025-01-27 PROCEDURE — G2211 COMPLEX E/M VISIT ADD ON: CPT

## 2025-03-05 ENCOUNTER — NON-APPOINTMENT (OUTPATIENT)
Age: 75
End: 2025-03-05

## 2025-03-05 ENCOUNTER — APPOINTMENT (OUTPATIENT)
Age: 75
End: 2025-03-05

## 2025-03-05 PROCEDURE — 92083 EXTENDED VISUAL FIELD XM: CPT

## 2025-03-05 PROCEDURE — 92012 INTRM OPH EXAM EST PATIENT: CPT

## 2025-03-05 PROCEDURE — ZZZZZ: CPT

## 2025-04-30 ENCOUNTER — NON-APPOINTMENT (OUTPATIENT)
Age: 75
End: 2025-04-30

## 2025-04-30 ENCOUNTER — APPOINTMENT (OUTPATIENT)
Dept: CARDIOLOGY | Facility: CLINIC | Age: 75
End: 2025-04-30
Payer: MEDICARE

## 2025-04-30 VITALS
BODY MASS INDEX: 25.61 KG/M2 | DIASTOLIC BLOOD PRESSURE: 80 MMHG | TEMPERATURE: 208.94 F | OXYGEN SATURATION: 96 % | SYSTOLIC BLOOD PRESSURE: 143 MMHG | HEART RATE: 57 BPM | HEIGHT: 68 IN | WEIGHT: 169 LBS | RESPIRATION RATE: 16 BRPM

## 2025-04-30 DIAGNOSIS — I25.5 ISCHEMIC CARDIOMYOPATHY: ICD-10-CM

## 2025-04-30 DIAGNOSIS — I25.10 ATHEROSCLEROTIC HEART DISEASE OF NATIVE CORONARY ARTERY W/OUT ANGINA PECTORIS: ICD-10-CM

## 2025-04-30 PROCEDURE — 93000 ELECTROCARDIOGRAM COMPLETE: CPT

## 2025-04-30 PROCEDURE — 99213 OFFICE O/P EST LOW 20 MIN: CPT

## 2025-06-18 ENCOUNTER — NON-APPOINTMENT (OUTPATIENT)
Age: 75
End: 2025-06-18

## 2025-06-18 ENCOUNTER — APPOINTMENT (OUTPATIENT)
Dept: RADIATION ONCOLOGY | Facility: CLINIC | Age: 75
End: 2025-06-18
Payer: MEDICARE

## 2025-06-18 VITALS
BODY MASS INDEX: 25.86 KG/M2 | WEIGHT: 170.09 LBS | RESPIRATION RATE: 16 BRPM | HEART RATE: 53 BPM | SYSTOLIC BLOOD PRESSURE: 119 MMHG | TEMPERATURE: 97.34 F | OXYGEN SATURATION: 98 % | DIASTOLIC BLOOD PRESSURE: 74 MMHG

## 2025-06-18 PROCEDURE — 99214 OFFICE O/P EST MOD 30 MIN: CPT

## (undated) DEVICE — BLADDER EVACUATOR ELLIK DISP STERILE

## (undated) DEVICE — GOWN TRIMAX LG

## (undated) DEVICE — GLV 7.5 PROTEXIS (WHITE)

## (undated) DEVICE — POSITIONER FOAM HEADREST (PINK)

## (undated) DEVICE — SYR ASEPTO

## (undated) DEVICE — PRESSURE INFUSOR BAG 3000ML

## (undated) DEVICE — TUBING SUCTION 20FT

## (undated) DEVICE — Device

## (undated) DEVICE — IRR BULB PATHFINDER + 10"

## (undated) DEVICE — PACK CYSTO

## (undated) DEVICE — SOL IRR POUR NS 0.9% 500ML

## (undated) DEVICE — WARMING BLANKET UPPER ADULT

## (undated) DEVICE — TUBING RANGER FLUID IRRIGATION SET DISP

## (undated) DEVICE — DRAPE EQUIPMENT COVER 27"

## (undated) DEVICE — GLV 7 PROTEXIS (WHITE)

## (undated) DEVICE — FOLEY TRAY 16FR 5CC LTX UMETER CLOSED

## (undated) DEVICE — FOLEY HOLDER STATLOCK 2 WAY ADULT

## (undated) DEVICE — DRAPE EQUIPMENT BANDED BAG 30 X 30" (SHOWER CAP)

## (undated) DEVICE — GLV 6.5 PROTEXIS (WHITE)

## (undated) DEVICE — VENODYNE/SCD SLEEVE CALF LARGE

## (undated) DEVICE — SOL IRR BAG H2O 3000ML

## (undated) DEVICE — SOL IRR BAG NS 0.9% 3000ML

## (undated) DEVICE — POSITIONER FOAM EGG CRATE ULNAR 2PCS (PINK)

## (undated) DEVICE — SOL IRR POUR H2O 1500ML

## (undated) DEVICE — ACMI SELF-SEALING SEAL UP TO 7FR

## (undated) DEVICE — GLV 8 PROTEXIS (WHITE)